# Patient Record
Sex: MALE | Race: WHITE | NOT HISPANIC OR LATINO | Employment: OTHER | URBAN - METROPOLITAN AREA
[De-identification: names, ages, dates, MRNs, and addresses within clinical notes are randomized per-mention and may not be internally consistent; named-entity substitution may affect disease eponyms.]

---

## 2017-06-11 ENCOUNTER — APPOINTMENT (OUTPATIENT)
Dept: RADIOLOGY | Facility: MEDICAL CENTER | Age: 67
DRG: 466 | End: 2017-06-11
Attending: ORTHOPAEDIC SURGERY
Payer: MEDICARE

## 2017-06-11 ENCOUNTER — APPOINTMENT (OUTPATIENT)
Dept: RADIOLOGY | Facility: MEDICAL CENTER | Age: 67
DRG: 466 | End: 2017-06-11
Attending: EMERGENCY MEDICINE
Payer: MEDICARE

## 2017-06-11 ENCOUNTER — HOSPITAL ENCOUNTER (OUTPATIENT)
Dept: RADIOLOGY | Facility: MEDICAL CENTER | Age: 67
End: 2017-06-11

## 2017-06-11 ENCOUNTER — RESOLUTE PROFESSIONAL BILLING HOSPITAL PROF FEE (OUTPATIENT)
Dept: HOSPITALIST | Facility: MEDICAL CENTER | Age: 67
End: 2017-06-11
Payer: MEDICARE

## 2017-06-11 ENCOUNTER — HOSPITAL ENCOUNTER (INPATIENT)
Facility: MEDICAL CENTER | Age: 67
LOS: 9 days | DRG: 466 | End: 2017-06-20
Attending: EMERGENCY MEDICINE | Admitting: INTERNAL MEDICINE
Payer: MEDICARE

## 2017-06-11 DIAGNOSIS — M00.262 STREPTOCOCCAL ARTHRITIS OF LEFT KNEE (HCC): ICD-10-CM

## 2017-06-11 LAB
ALBUMIN SERPL BCP-MCNC: 3.5 G/DL (ref 3.2–4.9)
ALBUMIN/GLOB SERPL: 1.3 G/DL
ALP SERPL-CCNC: 55 U/L (ref 30–99)
ALT SERPL-CCNC: 28 U/L (ref 2–50)
ANION GAP SERPL CALC-SCNC: 10 MMOL/L (ref 0–11.9)
APPEARANCE FLD: NORMAL
APPEARANCE UR: CLEAR
APTT PPP: 27.2 SEC (ref 24.7–36)
AST SERPL-CCNC: 14 U/L (ref 12–45)
BASOPHILS # BLD AUTO: 0 % (ref 0–1.8)
BASOPHILS # BLD: 0 K/UL (ref 0–0.12)
BILIRUB SERPL-MCNC: 0.5 MG/DL (ref 0.1–1.5)
BILIRUB UR QL STRIP.AUTO: NEGATIVE
BNP SERPL-MCNC: 67 PG/ML (ref 0–100)
BODY FLD TYPE: NORMAL
BUN SERPL-MCNC: 20 MG/DL (ref 8–22)
CALCIUM SERPL-MCNC: 8.5 MG/DL (ref 8.5–10.5)
CHLORIDE SERPL-SCNC: 105 MMOL/L (ref 96–112)
CO2 SERPL-SCNC: 18 MMOL/L (ref 20–33)
COLOR FLD: NORMAL
COLOR UR: YELLOW
CREAT SERPL-MCNC: 1.15 MG/DL (ref 0.5–1.4)
CRP SERPL HS-MCNC: 22.81 MG/DL (ref 0–0.75)
CSF COMMENTS 1658: NORMAL
EKG IMPRESSION: NORMAL
EOSINOPHIL # BLD AUTO: 0 K/UL (ref 0–0.51)
EOSINOPHIL NFR BLD: 0 % (ref 0–6.9)
EPI CELLS #/AREA URNS HPF: ABNORMAL /HPF
ERYTHROCYTE [DISTWIDTH] IN BLOOD BY AUTOMATED COUNT: 47 FL (ref 35.9–50)
FLUAV H1 2009 PAND RNA SPEC QL NAA+PROBE: NOT DETECTED
FLUAV RNA SPEC QL NAA+PROBE: NEGATIVE
FLUAV+FLUBV AG SPEC QL IA: NORMAL
FLUBV RNA SPEC QL NAA+PROBE: NEGATIVE
GFR SERPL CREATININE-BSD FRML MDRD: >60 ML/MIN/1.73 M 2
GLOBULIN SER CALC-MCNC: 2.6 G/DL (ref 1.9–3.5)
GLUCOSE SERPL-MCNC: 140 MG/DL (ref 65–99)
GLUCOSE UR STRIP.AUTO-MCNC: NEGATIVE MG/DL
GRAM STN SPEC: NORMAL
HCT VFR BLD AUTO: 42.1 % (ref 42–52)
HGB BLD-MCNC: 14.4 G/DL (ref 14–18)
INR PPP: 1.19 (ref 0.87–1.13)
KETONES UR STRIP.AUTO-MCNC: NEGATIVE MG/DL
LACTATE BLD-SCNC: 1.7 MMOL/L (ref 0.5–2)
LEUKOCYTE ESTERASE UR QL STRIP.AUTO: NEGATIVE
LYMPHOCYTES # BLD AUTO: 0.15 K/UL (ref 1–4.8)
LYMPHOCYTES NFR BLD: 0.9 % (ref 22–41)
MAGNESIUM SERPL-MCNC: 1.4 MG/DL (ref 1.5–2.5)
MANUAL DIFF BLD: ABNORMAL
MCH RBC QN AUTO: 31.4 PG (ref 27–33)
MCHC RBC AUTO-ENTMCNC: 34.2 G/DL (ref 33.7–35.3)
MCV RBC AUTO: 91.7 FL (ref 81.4–97.8)
MICRO URNS: ABNORMAL
MONOCYTES # BLD AUTO: 0.29 K/UL (ref 0–0.85)
MONOCYTES NFR BLD AUTO: 1.7 % (ref 0–13.4)
MORPHOLOGY BLD-IMP: NORMAL
MUCOUS THREADS #/AREA URNS HPF: ABNORMAL /HPF
NEUTROPHILS # BLD AUTO: 16.66 K/UL (ref 1.82–7.42)
NEUTROPHILS NFR BLD: 79.1 % (ref 44–72)
NEUTS BAND NFR BLD MANUAL: 18.3 % (ref 0–10)
NITRITE UR QL STRIP.AUTO: NEGATIVE
NRBC # BLD AUTO: 0 K/UL
NRBC BLD AUTO-RTO: 0 /100 WBC
PH UR STRIP.AUTO: 6 [PH]
PHOSPHATE SERPL-MCNC: 3.1 MG/DL (ref 2.5–4.5)
PLATELET # BLD AUTO: 119 K/UL (ref 164–446)
PLATELET BLD QL SMEAR: NORMAL
PMV BLD AUTO: 11.6 FL (ref 9–12.9)
POTASSIUM SERPL-SCNC: 3.6 MMOL/L (ref 3.6–5.5)
PROT SERPL-MCNC: 6.1 G/DL (ref 6–8.2)
PROT UR QL STRIP: 100 MG/DL
PROTHROMBIN TIME: 15.5 SEC (ref 12–14.6)
RBC # BLD AUTO: 4.59 M/UL (ref 4.7–6.1)
RBC # FLD: NORMAL CELLS/UL
RBC # URNS HPF: ABNORMAL /HPF
RBC BLD AUTO: NORMAL
RBC UR QL AUTO: ABNORMAL
SIGNIFICANT IND 70042: NORMAL
SIGNIFICANT IND 70042: NORMAL
SITE SITE: NORMAL
SITE SITE: NORMAL
SODIUM SERPL-SCNC: 133 MMOL/L (ref 135–145)
SOURCE SOURCE: NORMAL
SOURCE SOURCE: NORMAL
SP GR UR STRIP.AUTO: >1.035
TROPONIN I SERPL-MCNC: 0.04 NG/ML (ref 0–0.04)
WBC # BLD AUTO: 17.1 K/UL (ref 4.8–10.8)
WBC # FLD: NORMAL CELLS/UL
WBC #/AREA URNS HPF: ABNORMAL /HPF

## 2017-06-11 PROCEDURE — 503339 HCHG DRESSSING PICO: Performed by: ORTHOPAEDIC SURGERY

## 2017-06-11 PROCEDURE — 89051 BODY FLUID CELL COUNT: CPT

## 2017-06-11 PROCEDURE — 160036 HCHG PACU - EA ADDL 30 MINS PHASE I: Performed by: ORTHOPAEDIC SURGERY

## 2017-06-11 PROCEDURE — 160002 HCHG RECOVERY MINUTES (STAT): Performed by: ORTHOPAEDIC SURGERY

## 2017-06-11 PROCEDURE — 500891 HCHG PACK, ORTHO MAJOR: Performed by: ORTHOPAEDIC SURGERY

## 2017-06-11 PROCEDURE — 500054 HCHG BANDAGE, ELASTIC 6: Performed by: ORTHOPAEDIC SURGERY

## 2017-06-11 PROCEDURE — 501838 HCHG SUTURE GENERAL: Performed by: ORTHOPAEDIC SURGERY

## 2017-06-11 PROCEDURE — A6222 GAUZE <=16 IN NO W/SAL W/O B: HCPCS | Performed by: ORTHOPAEDIC SURGERY

## 2017-06-11 PROCEDURE — 501486 HCHG STRYKER IRRIG SET HC W/TUBING: Performed by: ORTHOPAEDIC SURGERY

## 2017-06-11 PROCEDURE — 73560 X-RAY EXAM OF KNEE 1 OR 2: CPT | Mod: LT

## 2017-06-11 PROCEDURE — 80053 COMPREHEN METABOLIC PANEL: CPT

## 2017-06-11 PROCEDURE — 500367 HCHG DRAIN KIT, HEMOVAC: Performed by: ORTHOPAEDIC SURGERY

## 2017-06-11 PROCEDURE — 86140 C-REACTIVE PROTEIN: CPT

## 2017-06-11 PROCEDURE — 700102 HCHG RX REV CODE 250 W/ 637 OVERRIDE(OP)

## 2017-06-11 PROCEDURE — 700111 HCHG RX REV CODE 636 W/ 250 OVERRIDE (IP)

## 2017-06-11 PROCEDURE — 160035 HCHG PACU - 1ST 60 MINS PHASE I: Performed by: ORTHOPAEDIC SURGERY

## 2017-06-11 PROCEDURE — 87400 INFLUENZA A/B EACH AG IA: CPT

## 2017-06-11 PROCEDURE — A9270 NON-COVERED ITEM OR SERVICE: HCPCS

## 2017-06-11 PROCEDURE — 700102 HCHG RX REV CODE 250 W/ 637 OVERRIDE(OP): Performed by: INTERNAL MEDICINE

## 2017-06-11 PROCEDURE — 700111 HCHG RX REV CODE 636 W/ 250 OVERRIDE (IP): Performed by: INTERNAL MEDICINE

## 2017-06-11 PROCEDURE — 87086 URINE CULTURE/COLONY COUNT: CPT

## 2017-06-11 PROCEDURE — 83605 ASSAY OF LACTIC ACID: CPT

## 2017-06-11 PROCEDURE — 99223 1ST HOSP IP/OBS HIGH 75: CPT | Mod: AI | Performed by: INTERNAL MEDICINE

## 2017-06-11 PROCEDURE — 700105 HCHG RX REV CODE 258

## 2017-06-11 PROCEDURE — 500122 HCHG BOVIE, BLADE: Performed by: ORTHOPAEDIC SURGERY

## 2017-06-11 PROCEDURE — 87205 SMEAR GRAM STAIN: CPT

## 2017-06-11 PROCEDURE — 502240 HCHG MISC OR SUPPLY RC 0272: Performed by: ORTHOPAEDIC SURGERY

## 2017-06-11 PROCEDURE — 84100 ASSAY OF PHOSPHORUS: CPT

## 2017-06-11 PROCEDURE — 99285 EMERGENCY DEPT VISIT HI MDM: CPT

## 2017-06-11 PROCEDURE — 85730 THROMBOPLASTIN TIME PARTIAL: CPT

## 2017-06-11 PROCEDURE — 87502 INFLUENZA DNA AMP PROBE: CPT

## 2017-06-11 PROCEDURE — 304561 HCHG STAT O2

## 2017-06-11 PROCEDURE — 83735 ASSAY OF MAGNESIUM: CPT

## 2017-06-11 PROCEDURE — 502000 HCHG MISC OR IMPLANTS RC 0278: Performed by: ORTHOPAEDIC SURGERY

## 2017-06-11 PROCEDURE — 770006 HCHG ROOM/CARE - MED/SURG/GYN SEMI*

## 2017-06-11 PROCEDURE — 700101 HCHG RX REV CODE 250

## 2017-06-11 PROCEDURE — 160041 HCHG SURGERY MINUTES - EA ADDL 1 MIN LEVEL 4: Performed by: ORTHOPAEDIC SURGERY

## 2017-06-11 PROCEDURE — 160009 HCHG ANES TIME/MIN: Performed by: ORTHOPAEDIC SURGERY

## 2017-06-11 PROCEDURE — 85007 BL SMEAR W/DIFF WBC COUNT: CPT

## 2017-06-11 PROCEDURE — 87503 INFLUENZA DNA AMP PROB ADDL: CPT

## 2017-06-11 PROCEDURE — 700105 HCHG RX REV CODE 258: Performed by: EMERGENCY MEDICINE

## 2017-06-11 PROCEDURE — 87040 BLOOD CULTURE FOR BACTERIA: CPT

## 2017-06-11 PROCEDURE — 87075 CULTR BACTERIA EXCEPT BLOOD: CPT

## 2017-06-11 PROCEDURE — 85610 PROTHROMBIN TIME: CPT

## 2017-06-11 PROCEDURE — 71010 DX-CHEST-PORTABLE (1 VIEW): CPT

## 2017-06-11 PROCEDURE — 0S9D3ZX DRAINAGE OF LEFT KNEE JOINT, PERCUTANEOUS APPROACH, DIAGNOSTIC: ICD-10-PCS | Performed by: ORTHOPAEDIC SURGERY

## 2017-06-11 PROCEDURE — 93005 ELECTROCARDIOGRAM TRACING: CPT | Performed by: EMERGENCY MEDICINE

## 2017-06-11 PROCEDURE — 87070 CULTURE OTHR SPECIMN AEROBIC: CPT

## 2017-06-11 PROCEDURE — 160048 HCHG OR STATISTICAL LEVEL 1-5: Performed by: ORTHOPAEDIC SURGERY

## 2017-06-11 PROCEDURE — 96360 HYDRATION IV INFUSION INIT: CPT

## 2017-06-11 PROCEDURE — 160029 HCHG SURGERY MINUTES - 1ST 30 MINS LEVEL 4: Performed by: ORTHOPAEDIC SURGERY

## 2017-06-11 PROCEDURE — 81001 URINALYSIS AUTO W/SCOPE: CPT

## 2017-06-11 PROCEDURE — 87077 CULTURE AEROBIC IDENTIFY: CPT

## 2017-06-11 PROCEDURE — 83880 ASSAY OF NATRIURETIC PEPTIDE: CPT

## 2017-06-11 PROCEDURE — 84484 ASSAY OF TROPONIN QUANT: CPT

## 2017-06-11 PROCEDURE — A9270 NON-COVERED ITEM OR SERVICE: HCPCS | Performed by: INTERNAL MEDICINE

## 2017-06-11 PROCEDURE — 85027 COMPLETE CBC AUTOMATED: CPT

## 2017-06-11 PROCEDURE — 700105 HCHG RX REV CODE 258: Performed by: INTERNAL MEDICINE

## 2017-06-11 DEVICE — IMPLANTABLE DEVICE: Type: IMPLANTABLE DEVICE | Status: FUNCTIONAL

## 2017-06-11 RX ORDER — AMOXICILLIN 250 MG
2 CAPSULE ORAL 2 TIMES DAILY
Status: DISCONTINUED | OUTPATIENT
Start: 2017-06-11 | End: 2017-06-18

## 2017-06-11 RX ORDER — SODIUM CHLORIDE 9 MG/ML
500 INJECTION, SOLUTION INTRAVENOUS
Status: COMPLETED | OUTPATIENT
Start: 2017-06-11 | End: 2017-06-11

## 2017-06-11 RX ORDER — LATANOPROST 50 UG/ML
1 SOLUTION/ DROPS OPHTHALMIC EVERY EVENING
Status: DISCONTINUED | OUTPATIENT
Start: 2017-06-11 | End: 2017-06-20 | Stop reason: HOSPADM

## 2017-06-11 RX ORDER — ONDANSETRON 2 MG/ML
4 INJECTION INTRAMUSCULAR; INTRAVENOUS EVERY 4 HOURS PRN
Status: DISCONTINUED | OUTPATIENT
Start: 2017-06-11 | End: 2017-06-20 | Stop reason: HOSPADM

## 2017-06-11 RX ORDER — SODIUM CHLORIDE 9 MG/ML
1000 INJECTION, SOLUTION INTRAVENOUS ONCE
Status: COMPLETED | OUTPATIENT
Start: 2017-06-11 | End: 2017-06-11

## 2017-06-11 RX ORDER — MAGNESIUM HYDROXIDE 1200 MG/15ML
LIQUID ORAL
Status: DISCONTINUED | OUTPATIENT
Start: 2017-06-11 | End: 2017-06-11 | Stop reason: HOSPADM

## 2017-06-11 RX ORDER — POLYETHYLENE GLYCOL 3350 17 G/17G
1 POWDER, FOR SOLUTION ORAL
Status: DISCONTINUED | OUTPATIENT
Start: 2017-06-11 | End: 2017-06-20 | Stop reason: HOSPADM

## 2017-06-11 RX ORDER — ENALAPRILAT 1.25 MG/ML
1.25 INJECTION INTRAVENOUS EVERY 6 HOURS PRN
Status: DISCONTINUED | OUTPATIENT
Start: 2017-06-11 | End: 2017-06-20 | Stop reason: HOSPADM

## 2017-06-11 RX ORDER — GLIMEPIRIDE 2 MG/1
1 TABLET ORAL 2 TIMES DAILY
Status: DISCONTINUED | OUTPATIENT
Start: 2017-06-11 | End: 2017-06-20 | Stop reason: HOSPADM

## 2017-06-11 RX ORDER — MAGNESIUM SULFATE HEPTAHYDRATE 40 MG/ML
4 INJECTION, SOLUTION INTRAVENOUS ONCE
Status: COMPLETED | OUTPATIENT
Start: 2017-06-11 | End: 2017-06-11

## 2017-06-11 RX ORDER — ACETAMINOPHEN 325 MG/1
650 TABLET ORAL EVERY 6 HOURS PRN
Status: DISCONTINUED | OUTPATIENT
Start: 2017-06-11 | End: 2017-06-20 | Stop reason: HOSPADM

## 2017-06-11 RX ORDER — BISACODYL 10 MG
10 SUPPOSITORY, RECTAL RECTAL
Status: DISCONTINUED | OUTPATIENT
Start: 2017-06-11 | End: 2017-06-20 | Stop reason: HOSPADM

## 2017-06-11 RX ORDER — POTASSIUM CHLORIDE 20 MEQ/1
40 TABLET, EXTENDED RELEASE ORAL ONCE
Status: COMPLETED | OUTPATIENT
Start: 2017-06-11 | End: 2017-06-11

## 2017-06-11 RX ORDER — SODIUM CHLORIDE 9 MG/ML
30 INJECTION, SOLUTION INTRAVENOUS
Status: COMPLETED | OUTPATIENT
Start: 2017-06-11 | End: 2017-06-13

## 2017-06-11 RX ORDER — LIDOCAINE HYDROCHLORIDE AND EPINEPHRINE BITARTRATE 20; .01 MG/ML; MG/ML
20 INJECTION, SOLUTION SUBCUTANEOUS ONCE
Status: DISPENSED | OUTPATIENT
Start: 2017-06-11 | End: 2017-06-12

## 2017-06-11 RX ORDER — ONDANSETRON 4 MG/1
4 TABLET, ORALLY DISINTEGRATING ORAL EVERY 4 HOURS PRN
Status: DISCONTINUED | OUTPATIENT
Start: 2017-06-11 | End: 2017-06-20 | Stop reason: HOSPADM

## 2017-06-11 RX ORDER — OXYCODONE HCL 5 MG/5 ML
SOLUTION, ORAL ORAL
Status: COMPLETED
Start: 2017-06-11 | End: 2017-06-11

## 2017-06-11 RX ADMIN — SODIUM CHLORIDE 500 ML: 9 INJECTION, SOLUTION INTRAVENOUS at 16:18

## 2017-06-11 RX ADMIN — MAGNESIUM SULFATE IN WATER 4 G: 40 INJECTION, SOLUTION INTRAVENOUS at 16:29

## 2017-06-11 RX ADMIN — SODIUM CHLORIDE 1000 ML: 9 INJECTION, SOLUTION INTRAVENOUS at 12:18

## 2017-06-11 RX ADMIN — VANCOMYCIN HYDROCHLORIDE 1500 MG: 100 INJECTION, POWDER, LYOPHILIZED, FOR SOLUTION INTRAVENOUS at 21:01

## 2017-06-11 RX ADMIN — OXYCODONE HYDROCHLORIDE 10 MG: 5 SOLUTION ORAL at 21:50

## 2017-06-11 RX ADMIN — AMPICILLIN SODIUM AND SULBACTAM SODIUM 3 G: 2; 1 INJECTION, POWDER, FOR SOLUTION INTRAMUSCULAR; INTRAVENOUS at 20:54

## 2017-06-11 RX ADMIN — POTASSIUM CHLORIDE 40 MEQ: 1500 TABLET, EXTENDED RELEASE ORAL at 16:28

## 2017-06-11 ASSESSMENT — LIFESTYLE VARIABLES
AVERAGE NUMBER OF DAYS PER WEEK YOU HAVE A DRINK CONTAINING ALCOHOL: 4
EVER FELT BAD OR GUILTY ABOUT YOUR DRINKING: NO
HAVE PEOPLE ANNOYED YOU BY CRITICIZING YOUR DRINKING: NO
ON A TYPICAL DAY WHEN YOU DRINK ALCOHOL HOW MANY DRINKS DO YOU HAVE: 1
HAVE YOU EVER FELT YOU SHOULD CUT DOWN ON YOUR DRINKING: NO
TOTAL SCORE: 0
TOTAL SCORE: 0
CONSUMPTION TOTAL: NEGATIVE
TOTAL SCORE: 0
EVER HAD A DRINK FIRST THING IN THE MORNING TO STEADY YOUR NERVES TO GET RID OF A HANGOVER: NO
ALCOHOL_USE: YES
HOW MANY TIMES IN THE PAST YEAR HAVE YOU HAD 5 OR MORE DRINKS IN A DAY: 0
EVER_SMOKED: NEVER

## 2017-06-11 ASSESSMENT — PAIN SCALES - GENERAL
PAINLEVEL_OUTOF10: 3
PAINLEVEL_OUTOF10: 1
PAINLEVEL_OUTOF10: 6
PAINLEVEL_OUTOF10: 1

## 2017-06-11 NOTE — IP AVS SNAPSHOT
6/20/2017    Tony Stokes  [address]  [facility] [address]    Dear Tony:    [facility] wants to ensure your discharge home is safe and you or your loved ones have had all of your questions answered regarding your care after you leave the hospital.    Below is a list of resources and contact information should you have any questions regarding your hospital stay, follow-up instructions, or active medical symptoms.    Questions or Concerns Regarding… Contact   Medical Questions Related to Your Discharge  (7 days a week, 8am-5pm) Contact a Nurse Care Coordinator   [phone]   Medical Questions Not Related to Your Discharge  (24 hours a day / 7 days a week)  Contact the Nurse Health Line   [phone]    Medications or Discharge Instructions Refer to your discharge packet   or contact your [facility] Primary Care Provider   [phone]   Follow-up Appointment(s) Schedule your appointment via [employer]   or contact Scheduling [phone]   Billing Review your statement via [employer]  or contact Billing [phone]   Medical Records Review your records via [employer]   or contact Medical Records [phone]     You may receive a telephone call within two days of discharge. This call is to make certain you understand your discharge instructions and have the opportunity to have any questions answered. You can also easily access your medical information, test results and upcoming appointments via the [employer] free online health management tool. You can learn more and sign up at [employer]. For assistance setting up your [employer] account, please call [phone].    Once again, we want to ensure your discharge home is safe and that you have a clear understanding of any next steps in your care. If you have any questions or concerns, please do not hesitate to contact us, we are here for you. Thank you for choosing [facility] for your healthcare needs.    Sincerely,    Your [facility] Healthcare Team

## 2017-06-11 NOTE — ED NOTES
68 y/o male bib [facility] from [facility] for evaluation of possible infection in his left knee where he has had knee replacement surgery. Pt was given Rocephin 2 grams and Vancomycin prior to arrival to ED.

## 2017-06-11 NOTE — IP AVS SNAPSHOT
Coordi-Careâ€™s Access Code: [email or web address]  Expires: 7/20/2017  3:50 PM    Coordi-Careâ€™s  A secure, online tool to manage your health information     [employer] Coordi-Careâ€™s® is a secure, online tool that connects you to your personalized health information from the privacy of your home -- day or night - making it very easy for you to manage your healthcare. Once the activation process is completed, you can even access your medical information using the Coordi-Careâ€™s serena, which is available for free in the Apple Serena store or Google Play store.     Coordi-Careâ€™s provides the following levels of access (as shown below):   My Chart Features   [facility] Primary Care Doctor [facility]  Specialists [facility]  Urgent  Care Non-[facility]  Primary Care  Doctor   Email your healthcare team securely and privately 24/7 X X X X   Manage appointments: schedule your next appointment; view details of past/upcoming appointments X      Request prescription refills. X      View recent personal medical records, including lab and immunizations X X X X   View health record, including health history, allergies, medications X X X X   Read reports about your outpatient visits, procedures, consult and ER notes X X X X   See your discharge summary, which is a recap of your hospital and/or ER visit that includes your diagnosis, lab results, and care plan. X X       How to register for Coordi-Careâ€™s:  1. Go to  [email or web address].  2. Click on the Sign Up Now box, which takes you to the New Member Sign Up page. You will need to provide the following information:  a. Enter your Coordi-Careâ€™s Access Code exactly as it appears at the top of this page. (You will not need to use this code after you’ve completed the sign-up process. If you do not sign up before the expiration date, you must request a new code.)   b. Enter your date of birth.   c. Enter your home email address.   d. Click Submit, and follow the next screen’s instructions.  3. Create a Coordi-Careâ€™s ID. This will be your [employer]  login ID and cannot be changed, so think of one that is secure and easy to remember.  4. Create a [employer] password. You can change your password at any time.  5. Enter your Password Reset Question and Answer. This can be used at a later time if you forget your password.   6. Enter your e-mail address. This allows you to receive e-mail notifications when new information is available in [employer].  7. Click Sign Up. You can now view your health information.    For assistance activating your [employer] account, call [phone]

## 2017-06-11 NOTE — PROGRESS NOTES
Patient arrived to unit by yi, no report received from ED, attempted to contact and was told pt was on his way to room and she was just covering for transferring nurse.  Two RN skin check completed with Fatou RN, pt skin is intact with no s/s of open wounds or breakdown.  Oxygen by NC at 4L, IV s/l, weber POA; per pt placed in [facility] prior to transfer, room orientation completed, fall precautions in place, current POC/orders discussed with pt and spouse at bedside.

## 2017-06-11 NOTE — IP AVS SNAPSHOT
" <p align=\"LEFT\"><IMG SRC=\"//[email or web address]$/[email or web address]\" alt=\"Image\" WIDTH=\"50%\" HEIGHT=\"200\" BORDER=\"\"></p>                   Name:Tony Stokes  Medical Record Number:[id number]  CSN: [id number]    YOB: 1950   Age: 67 y.o.  Sex: male  HT:  WT: 117.935 kg (260 lb)          Admit Date: 6/11/2017     Discharge Date:   Today's Date: 6/20/2017  Attending Doctor:  Dakota Guzman M.D.                  Allergies:  Review of patient's allergies indicates no known allergies.          Follow-up Information     1. Follow up with Huang Cat M.D.. Schedule an appointment as soon as possible for a visit in 2 weeks.    Specialty:  Orthopaedics    Contact information    [address] [place] [address]  [phone]           Medication List      Take these Medications        Instructions    acetaminophen 325 MG Tabs   Commonly known as:  TYLENOL    Take 2 Tabs by mouth every 6 hours as needed (Mild Pain; (Pain scale 1-3); Temp greater than 100.5 F).   Dose:  650 mg       heparin 5000 UNIT/ML Soln    Inject 1 mL as instructed every 12 hours.   Dose:  5000 Units       latanoprost 0.005 % Soln   Commonly known as:  XALATAN    Place 1 Drop in both eyes every evening.   Dose:  1 Drop       NS SOLN 100 mL with cefTRIAXone 2 GM SOLR 2 g    2 g by Intravenous route every 24 hours for 27 days.   Dose:  2 g       ondansetron 4 MG Tbdp   Commonly known as:  ZOFRAN ODT    Take 1 Tab by mouth every four hours as needed for Nausea/Vomiting (give PO if IV route is unavailable. May give per feeding tube.).   Dose:  4 mg       polyethylene glycol/lytes Pack   Commonly known as:  MIRALAX    Take 1 Packet by mouth every day.   Dose:  17 g       timolol 0.25 % Soln   What changed:  when to take this   Commonly known as:  TIMOPTIC    Place 1 Drop in both eyes every day.   Dose:  1 Drop         "

## 2017-06-11 NOTE — PROGRESS NOTES
Pharmacy Kinetics 67 y.o. male on vancomycin day # 1 2017    Currently on Vancomycin new start (received 1 g @ 1100 )    Indication for Treatment: septic L knee     Pertinent history per medical record: Admitted on 2017 for L leg swelling and pain. Patient has a history of bilateral TKA, and has experienced similar episodes in the past with cultures demonstrating Group G Streptococcus. Patient received vancomycin 1000 mg IV PTA.     Other antibiotics: Unasyn 3 g IV q6h    Allergies: Review of patient's allergies indicates no known allergies.     List concerns for renal function: age, BMI ~36    Pertinent cultures to date:    PBC x 2: in process   fluid culture: in process   influenza PCR & rapid: negative    Recent Labs      17   1220   WBC  17.1*   NEUTSPOLYS  79.10*   BANDSSTABS  18.30*     Recent Labs      17   1220   BUN  20   CREATININE  1.15   ALBUMIN  3.5     Blood pressure 102/74, pulse 96, temperature 36.7 °C (98 °F), resp. rate 18, weight 117.935 kg (260 lb), SpO2 94 %. Temp (24hrs), Av.7 °C (98 °F), Min:36.7 °C (98 °F), Max:36.7 °C (98 °F)      A/P   1. Vancomycin dose change: initiate 1500 mg IV q12h  2. Next vancomycin level: prior to 4th total dose (not yet ordered)  3. Goal trough: 12-16 mcg/mL  4. Comments: new start vancomycin for possible infected prosthetic joint. Fluid and blood cultures in process.  Patient has tolerated this dose in the past, was discharged to [facility] in  on q12h dosing. Trough levels at that time were not quite therapeutic. Will initiate this dosing for now and check a level when at steady state. Renal function close to baseline. Will continue to follow.    Lorelei Hilton, PHARMD

## 2017-06-11 NOTE — ED PROVIDER NOTES
ED Provider Note    Scribed for Raciel Colvin D.O. by Janelle Moody. 6/11/2017  11:49 AM    Primary care provider: Jakob Magana M.D.  Means of arrival: Care flight  History obtained from: Patient   History limited by: None    CHIEF COMPLAINT  Chief Complaint   Patient presents with   • Knee Pain   • Blood Infection       HPI  Tony Stokes is a 67 y.o. male who presents to the Emergency Department by care flight as a transfer from [facility] for left leg swelling and right leg pain, onset last night. He began feeling severe left knee pain last night and noticed new swelling to his left leg. The patient states it is painful for him to ambulate. He began having a fever yesterday afternoon. The patient has had a runny nose and cough with sputum production for two days. He denies dysuria or abdominal pain associated. He has history of double knee replacement preformed by Dr. Lewis at [facility]. The patient has history of blood clot in left leg but is not taking blood thinners.     Patient received 2g of Rocephin and 1g of Vancomycin while in transit and has a weber catheter in place.    Impression from outside hospital:  Exam is limited due to suboptimal contrast enhancement of the pulmonary arteries. There is no obvious central pulmonary embolism. There are mild atelectatic changes involving the right lower lobe with elevation of right hemidiaphragm. This is unchanged in appearance when compared to 1/4/15.      REVIEW OF SYSTEMS  Pertinent positives include left leg swelling, left knee pain. Pertinent negatives include no abdominal pain or dysuria.  All other systems reviewed and negative.  C.    PAST MEDICAL HISTORY  Past Medical History   Diagnosis Date   • Arthritis      knees   • Glaucoma      mavis eyes   • MRSA (methicillin resistant staph aureus) culture positive      per nasal swab   • Snoring    • Unspecified hemorrhagic conditions      Coumadin   • Personal history of  venous thrombosis and embolism 3/2014     leg and  bilateral PE       SURGICAL HISTORY  Past Surgical History   Procedure Laterality Date   • Irrigation & debridement ortho  3/23/2014     Performed by Blas Lewis M.D. at [facility]   • Other neurological surg  2009     laminectomy   • Irrigation & debridement ortho  7/23/2014     Performed by Blas Lewis M.D. at SUR[facility]   • Knee revision total  2004     Right   • Knee revision total  2010     Left   • Femur orif  1983     Right   • Other orthopedic surgery  3/24/2014     sremoval of prosthesis   • Knee revision total  9/11/2014     Performed by Blas Lewis M.D. at SUR[facility]ERY [facility]        SOCIAL HISTORY  Social History   Substance Use Topics   • Smoking status: Never Smoker    • Smokeless tobacco: Never Used   • Alcohol Use: Yes      Comment: 1-2 per week      History   Drug Use No       FAMILY HISTORY  No family history noted    CURRENT MEDICATIONS  No current facility-administered medications on file prior to encounter.     Current Outpatient Prescriptions on File Prior to Encounter   Medication Sig Dispense Refill   • timolol (TIMOPTIC) 0.25 % SOLN Place 1 Drop in both eyes every day. (Patient taking differently: Place 1 Drop in both eyes 2 times a day.) 1 Bottle 3   • latanoprost (XALATAN) 0.005 % SOLN Place 1 Drop in both eyes every evening. 1 Bottle          ALLERGIES  No Known Allergies    PHYSICAL EXAM  VITAL SIGNS: /74 mmHg  Pulse 96  Temp(Src) 36.7 °C (98 °F)  Resp 18  Wt 117.935 kg (260 lb)  SpO2 94%    Nursing notes and vitals reviewed.  Constitutional: Well developed, Well nourished, slight distress, Non-toxic appearance.   Eyes: PERRLA, EOMI, Conjunctiva normal, No discharge.   Cardiovascular: Normal heart rate, Normal rhythm, No murmurs, No rubs, No gallops.   Thorax & Lungs: No respiratory distress, No rales, No rhonchi, No wheezing, No chest tenderness.   Abdomen: Bowel sounds normal,  Soft, No tenderness, No guarding, No rebound, No masses, No pulsatile masses.   Skin: Warm, Dry, No erythema, No rash. Diaphoretic. Erythema that is circumferential  to left lower extremity from knee down to ankle.   Musculoskeletal: Intact distal pulses, No edema, No cyanosis, No clubbing.  No tenderness to palpation or major deformities noted, no CVA tenderness, no midline back tenderness. Exquisite tenderness to left knee joint and exquisite difficulty with flexion of left knee secondary to pain. Slightly edematous left ex. Pulses are brisk.   Neurologic: Alert & oriented x 3, Normal motor function, Normal sensory function, No focal deficits noted.  Psychiatric: Affect normal for clinical presentation.    DIAGNOSTIC STUDIES/PROCEDURES    LABS  Results for orders placed or performed during the hospital encounter of 06/11/17   LACTIC ACID   Result Value Ref Range    Lactic Acid 1.7 0.5 - 2.0 mmol/L   CBC WITH DIFFERENTIAL   Result Value Ref Range    WBC 17.1 (H) 4.8 - 10.8 K/uL    RBC 4.59 (L) 4.70 - 6.10 M/uL    Hemoglobin 14.4 14.0 - 18.0 g/dL    Hematocrit 42.1 42.0 - 52.0 %    MCV 91.7 81.4 - 97.8 fL    MCH 31.4 27.0 - 33.0 pg    MCHC 34.2 33.7 - 35.3 g/dL    RDW 47.0 35.9 - 50.0 fL    Platelet Count 119 (L) 164 - 446 K/uL    MPV 11.6 9.0 - 12.9 fL    Nucleated RBC 0.00 /100 WBC    NRBC (Absolute) 0.00 K/uL    Neutrophils-Polys 79.10 (H) 44.00 - 72.00 %    Lymphocytes 0.90 (L) 22.00 - 41.00 %    Monocytes 1.70 0.00 - 13.40 %    Eosinophils 0.00 0.00 - 6.90 %    Basophils 0.00 0.00 - 1.80 %    Neutrophils (Absolute) 16.66 (H) 1.82 - 7.42 K/uL    Lymphs (Absolute) 0.15 (L) 1.00 - 4.80 K/uL    Monos (Absolute) 0.29 0.00 - 0.85 K/uL    Eos (Absolute) 0.00 0.00 - 0.51 K/uL    Baso (Absolute) 0.00 0.00 - 0.12 K/uL   COMP METABOLIC PANEL   Result Value Ref Range    Sodium 133 (L) 135 - 145 mmol/L    Potassium 3.6 3.6 - 5.5 mmol/L    Chloride 105 96 - 112 mmol/L    Co2 18 (L) 20 - 33 mmol/L    Anion Gap 10.0 0.0 - 11.9     Glucose 140 (H) 65 - 99 mg/dL    Bun 20 8 - 22 mg/dL    Creatinine 1.15 0.50 - 1.40 mg/dL    Calcium 8.5 8.5 - 10.5 mg/dL    AST(SGOT) 14 12 - 45 U/L    ALT(SGPT) 28 2 - 50 U/L    Alkaline Phosphatase 55 30 - 99 U/L    Total Bilirubin 0.5 0.1 - 1.5 mg/dL    Albumin 3.5 3.2 - 4.9 g/dL    Total Protein 6.1 6.0 - 8.2 g/dL    Globulin 2.6 1.9 - 3.5 g/dL    A-G Ratio 1.3 g/dL   URINALYSIS   Result Value Ref Range    Micro Urine Req Microscopic     Color Yellow     Character Clear     Specific Gravity >1.035 (A) <1.035    Ph 6.0 5.0-8.0    Glucose Negative Negative mg/dL    Ketones Negative Negative mg/dL    Protein 100 (A) Negative mg/dL    Bilirubin Negative Negative    Nitrite Negative Negative    Leukocyte Esterase Negative Negative    Occult Blood Moderate (A) Negative   MAGNESIUM   Result Value Ref Range    Magnesium 1.4 (L) 1.5 - 2.5 mg/dL   PHOSPHORUS   Result Value Ref Range    Phosphorus 3.1 2.5 - 4.5 mg/dL   Influenza Rapid   Result Value Ref Range    Significant Indicator NEG     Source RESP     Site Nasopharyngeal     Rapid Influenza A-B       Negative for Influenza A and Influenza B antigens.  Infection due to influenza A or B cannot be ruled out  since the antigen present in the specimen may be below the  detection limit of the test. Culture confirmation of  negative samples is recommended.     TROPONIN   Result Value Ref Range    Troponin I 0.04 0.00 - 0.04 ng/mL   BNP   Result Value Ref Range    B Natriuretic Peptide 67 0 - 100 pg/mL   PROTHROMBIN TIME   Result Value Ref Range    PT 15.5 (H) 12.0 - 14.6 sec    INR 1.19 (H) 0.87 - 1.13   APTT   Result Value Ref Range    APTT 27.2 24.7 - 36.0 sec   INFLUENZA BY PCR, A/B/H1N1   Result Value Ref Range    Influenza virus A RNA Negative Negative    Influenza virus B RNA Negative Negative    Influenza A 2009, H1N1, PCR Not Detected Negative   URINE MICROSCOPIC (W/UA)   Result Value Ref Range    WBC 2-5 (A) /hpf    RBC 10-20 (A) /hpf    Epithelial Cells Few  /hpf    Mucous Threads Few /hpf   DIFFERENTIAL MANUAL   Result Value Ref Range    Bands-Stabs 18.30 (H) 0.00 - 10.00 %    Manual Diff Status PERFORMED    PERIPHERAL SMEAR REVIEW   Result Value Ref Range    Peripheral Smear Review see below    PLATELET ESTIMATE   Result Value Ref Range    Plt Estimation Decreased    MORPHOLOGY   Result Value Ref Range    RBC Morphology Normal    ESTIMATED GFR   Result Value Ref Range    GFR If African American >60 >60 mL/min/1.73 m 2    GFR If Non African American >60 >60 mL/min/1.73 m 2   EKG (ER)   Result Value Ref Range    Report       [facility] Emergency Dept.    Test Date:  2017  Pt Name:    MIRANDA GARCIA                 Department: ER  MRN:        [id number]                      Room:        12  Gender:     M                            Technician: [id number]  :        1950                   Requested By:GINA BUENO  Order #:    [id number]                    Reading MD: GINA BUENO DO    Measurements  Intervals                                Axis  Rate:       92                           P:          51  DC:         148                          QRS:        -18  QRSD:       120                          T:          45  QT:         380  QTc:        471    Interpretive Statements  SINUS RHYTHM  NONSPECIFIC INTRAVENTRICULAR CONDUCTION DELAY  Compared to ECG 2014 11:53:29  Sinus tachycardia no longer present    Electronically Signed On 2017 14:05:37 PDT by GINA BUENO DO         All labs reviewed by me.       RADIOLOGY  OUTSIDE IMAGES-CT CHEST   Preliminary Result      DX-CHEST-PORTABLE (1 VIEW)    (Results Pending)   DX-KNEE 2- LEFT    (Results Pending)     The radiologist's interpretation of all radiological studies have been reviewed by me.    COURSE & MEDICAL DECISION MAKING  Pertinent Labs & Imaging studies reviewed. (See chart for details)    EKG reviewed for comparison which was negative. CBC and chemistry also  reviewed for comparison. Old notes reviewed for comparison.    11:49 AM - Patient seen and examined at bedside. Patient will be treated with IV fluids for sepsis, lidocaine-epinephrine 2% injection. Ordered DX-chest, Lactic acid, influenza by PCR, Phosphorus, influenza rapid, troponin, BNP, CBC with differential, CMP, Urinalysis, urine culture, blood culture, magnesium, prothrombin time, APTT, EKG to evaluate his symptoms.    12:24 PM Paged Dr. Cat (Orthopedics).       12:38 PM - I discussed the patient's case and the above findings with Dr. Cat (Orthopedics) who agrees to see the patient.   This is a charming 67 y.o. male that presents with sepsis and cellulitis the left lower extremity. The workup of the outlying facility included a CTA pulmonary and U Matthew was negative for large pulmonary embolism or infiltrate. Here the patient's lactic acid is 1.7, does have a leukocytosis, he has a normal blood pressure and does not require vasopressors and is hemodynamically stable. Dr. Nicholas with orthopedics of doing arthrocentesis and the patient has received antibiotics in the form of Rocephin and vancomycin prior to arrival. The patient has been admitted to Dr. Nicholas for further evaluation, management and Dr. Nicholas with orthopedics will be addressing his cellulitis and possible toxic knee joint.    FINAL IMPRESSION  Cellulitis  Septic joint  Sepsis     Janelle LANE (Alvin), am scribing for, and in the presence of, Raciel Colvin D.O    Electronically signed by: Janelle Moody (Alvin), 6/11/2017    aRciel LANE D.O. personally performed the services described in this documentation, as scribed by Janelle Moody in my presence, and it is both accurate and complete.    The note accurately reflects work and decisions made by me.  Raciel Colvin  6/11/2017  2:05 PM

## 2017-06-11 NOTE — CONSULTS
DATE OF SERVICE:  06/11/2017    HISTORY OF PRESENT ILLNESS:  This is a very pleasant 67-year-old man who was   flown here from [facility] for left leg pain and swelling, which happened   since last night.  He did have severe left knee pain last night and   significant swelling.  It has been painful for him to walk.  He has also had a   runny nose and cough with sputum production for the last 2 days.  He was   given Rocephin and vancomycin and a Boyce was placed at an outside hospital.    By report, the patient had a revision of total knee replacement for infection   in 2014 by Dr. Blas Lewis.    PHYSICAL EXAMINATION  EXTREMITIES:  He has bilateral knee replacements.  The right knee appears to   be completely benign with a well-healed incision and no neurologic deficits or   erythema.  The left knee; however, does appear erythematous around the   incision and he has erythema distally all the way to the top of the ankle.  He   is blanching.  He does have some redness focally around the distal aspect of   his prior total knee replacement incision.  Range of motion about the knee is   painful.    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    ASSESSMENT AND PLAN:  I would like to get x-rays for this patient to take a   look at the knee.  I will also send the images to Dr. Lewis.  I will defer at   this time to aspirating the knee until I hear back from him regarding a plan.    I would like the patient to be kept n.p.o. just in case he needs an operation   today versus tomorrow.  I will provide further recommendations per Dr. Lewis.    Unfortunately, antibiotics have been administered.  Obviously, I prefer that   they have not been, but it sounds like the patient did meet septic criteria.    Obviously, if the patient is unstable certainly please administer   antibiotics, but I would prefer that they be held at this time.  I will follow   for further recommendations.        ____________________________________     MD SILVANO Lawrence    DD:  06/11/2017 12:28:38  DT:  06/11/2017 16:12:07    D#:  [id number]  Job#:  [id number]

## 2017-06-11 NOTE — IP AVS SNAPSHOT
Home Care Instructions                                                                                                                  Name:Tony Stokes  Medical Record Number:[id number]  CSN: [id number]    YOB: 1950   Age: 67 y.o.  Sex: male  HT:  WT: 117.935 kg (260 lb)          Admit Date: 6/11/2017     Discharge Date:   Today's Date: 6/20/2017  Attending Doctor:  Dakota Guzman M.D.                  Allergies:  Review of patient's allergies indicates no known allergies.            Discharge Instructions       Discharge Instructions    Discharged to [facility] by car with escort. Discharged via wheelchair, hospital escort: Yes.  Special equipment needed: Not Applicable    Be sure to schedule a follow-up appointment with your primary care doctor or any specialists as instructed.     Discharge Plan:   Diet Plan: Discussed  Activity Level: Discussed  Confirmed Follow up Appointment: Patient to Call and Schedule Appointment  Confirmed Symptoms Management: Discussed  Medication Reconciliation Updated: Yes  Pneumococcal Vaccine Given - only chart on this line when given: Given (See MAR)  Influenza Vaccine Indication: Patient Refuses    I understand that a diet low in cholesterol, fat, and sodium is recommended for good health. Unless I have been given specific instructions below for another diet, I accept this instruction as my diet prescription.   Other diet: Regular    Special Instructions: Discharge instructions for the Orthopedic Patient    Follow up with Primary Care Physician within 2 weeks of discharge to home, regarding:  Review of medications and diagnostic testing.  Surveillance for medical complications.  Workup and treatment of osteoporosis, if appropriate.     -Is this a Joint Replacement patient? Yes Total Joint Knee Replacement Discharge Instructions    Pain  - The goal is to slowly wean off the prescription pain medicine.  - Ice can be used for pain control.  20 minutes at a  time is recommended, and never directly against your skin or incision.  - Most patients are off the pain pills by 3 weeks; others may require a low level of pain medications for many months.  If your pain continues to be severe, follow up with your physician.  Infection    Knee joint infections; occur in fewer than 2% of patients. The most common causes of infection following total knee replacement surgery are from bacteria that enter the bloodstream during dental procedures, urinary tract infections, or skin infections. These bacteria can lodge around your knee replacement and cause an infection.  - Keep the incision as clean and dry as possible.  - Always wash your hands before touching your incision.  - Skin infections tend to develop around 7-10 days after surgery; most can be treated with oral antibiotics.  - Dental Care should be delayed for 3 months after surgery, your surgeon recommends taking a dose of antibiotics 1 hour prior to any dental procedure. After 2 years, most surgeons recommend antibiotics only before an extensive procedure.  Ask your surgeon what he recommends.  - Signs and symptoms of infection can include:  low grade fever, redness, pain, swelling and drainage from your incision.  Notify your surgeon immediately if you develop any of these symptoms.  Other instructions  - Bowel habits - constipation is extremely common and is caused by a combination of anesthesia, lack of mobility and pain medicine.  Use stool softeners or laxatives if necessary. It is important not to ignore this problem, as bowel obstructions can be a serious complication after joint replacement surgery.  - Mood/Energy Level - Many patients experience a lack of energy and endurance for up to 2-3 months after surgery.  Some may also feel down and can even become depressed.  This is likely due to the postoperative anemia, change in activity level, lack of sleep, pain medicine and just the emotional reaction to the surgery  itself that is a big disruption in a person’s life.  This usually passes.  If symptoms persist, follow up with your primary physician.  - Returning to work - Your surgeon will give you more specific instructions. Depending on the type of activities you perform, it may be 6 to 8 weeks before you return to work.   Generally, if you work a sedentary job requiring little standing or walking, most patients may return within 2-6 weeks.  Manual labor jobs involving walking, lifting and standing may take longer. Your surgeon’s office can provide a release to part-time or light duty work early on in your recovery and progress you to full duty as able.    - Driving - If your left knee was replaced and you have an automatic transmission, you may be able to begin driving in a week or so, provided you are no longer taking narcotic pain medication. If your right knee was replaced, avoid driving for 6 to 8 weeks. Remember that your reflexes may not be as sharp as before your surgery. Ask your surgeon for specific instructions.   - Avoiding falls - A fall during the first few weeks after surgery can damage your new knee and may result in a need for further surgery.   throw rugs and tack down loose carpeting.  Be aware of floor hazards such as pets, small objects or uneven surfaces.    - [employer] Metal Detectors - The sensitivity of metal detectors varies and it is likely that your prosthesis will cause an alarm.  Inform the  of your artificial joint.  Diet  - Resume your normal diet as tolerated.  - It is important to achieve a healthy nutritional status by eating a well balanced diet on a regular basis.  - Your physician may recommend that you take iron and vitamin supplements.   - Continue to drink plenty of fluids.  Shower/Bathing  - You may shower as soon as you get home from the hospital unless otherwise instructed.  - Keep your incision out of water.  To keep the incision dry when showering, cover it  with a plastic bag or plastic wrap.  - Pat incision dry if it gets wet.  Don’t rub.  - Do not submerge in a bath until staples are out and the incision is completely healed. (Approximately 6-8 weeks)  Dressing Change:  Procedure (if recommended by your physician)  - Wash hands.  - Open all new dressing change materials.  - Remove old dressing and discard.  - Inspect incision for redness, increase in clear drainage, yellow/green drainage, odor and surrounding skin hot to touch.  -  ABD (large gauze) pad or “island dressing” by one corner and lay over the incision.  Be careful not to touch the inside of the dressing that will lay over the incision.  - Secure in place as instructed (Ace wrap or tape).    Swelling/Bruising    - Swelling can last from 3-6 months.  - Elevate your leg higher than your heart while reclining.   The first week you are home you should elevate your leg an equal amount of time, as you are active.    - Anti-inflammatory pills can be taken once you have stopped the blood thinners.  - The swelling is usually worse after you go home since you are upright for longer periods of time.  - Bruising is common and can involve the entire leg including the thigh, calf and even foot. Bruising often does not appear until after you arrive home and it can be quite dramatic- purple, black, and green.  The bruising you can see is not usually concerning and will subside without any treatment.      Blood Clot Prevention  Blood clots in the legs and the less common, but frightening, clots that travel to the lungs are a real focus of our preventative. Most patients are at standard risk for them, but those patients who are at higher risk include people who have had previous clots, a family history of clotting, smoking, diabetes, obesity, advanced age, use of estrogen and a sedentary lifestyle.    - Signs of blood clots in legs - Swelling in thigh, calf or ankle that does not go down with elevation.  Pain, heat  and tenderness in calf, back of calf or groin area.  NOTE: blood clots can occur in either leg.  - You have been receiving anticoagulant therapy (blood thinners) in the hospital and you may be instructed to continue at home depending on your risk factors.  - Your risk for developing a clot continues for up to 2-3 months after surgery.  You should avoid prolonged sitting and dehydration during that time (long air trips and car trips).  If you do take a trip during this time, please get up and move around every 1- 1.5 hours.  - If you are prescribed blood-thinning medication for home, follow instructions as directed. (Handouts provided if applicable).      Activity  Once home, you should continue to stay active. The key is to remember not to overdo it! While you can expect some good days and some bad days, you should notice a gradual improvement and a gradual increase in your endurance over the next 6 to 12 months. Exercise is a critical component of home care, particularly during the first few weeks after surgery.     - Normal activities of daily living You should be able to resume most within 3 to 6 weeks following surgery. Some pain with activity and at night is common for several weeks after surgery  -  Physical Therapy Exercises - Continue to do the exercises prescribed for at least two months after surgery. Riding a stationary bicycle can help maintain muscle tone and keep your knee flexible. Try to achieve the maximum degree of bending and extension possible. (handout provided by Therapist).  - Sexual Activity -. Your surgeon can tell you when it safe to resume sexual activity.    - Sleeping Positions - You can safely sleep on your back, on either side, or on your stomach.   - Other Activities - Walk as much as you like, but remember that walking is no substitute for the exercises your doctor and physical therapist will prescribe. Lower impact activities are preferred.  If you have specific questions, consult  "your Surgeon.    When to Call the Doctor   Call the physician if:   - Fever over 100.5? F  - Increased pain, drainage, redness, odor or heat around the incision area  - Shaking chills  - Increased knee pain with activity and rest  - Increased pain in calf, tenderness or redness above or below the knee  - Increased swelling of calf, ankle, foot  - Sudden increased shortness of breath, sudden onset of chest pain, localized chest pain with coughing  - Incision opening  Or, if there are any questions or concerns about medications or care.       -Is this patient being discharged with medication to prevent blood clots?  No    · Is patient discharged on Warfarin / Coumadin?   No     · Is patient Post Blood Transfusion?  No    PICC Home Guide  A peripherally inserted central catheter (PICC) is a long, thin, flexible tube that is inserted into a vein in the upper arm. It is a form of intravenous (IV) access. It is considered to be a \"central\" line because the tip of the PICC ends in a large vein in your chest. This large vein is called the superior vena cava (SVC). The PICC tip ends in the SVC because there is a lot of blood flow in the SVC. This allows medicines and IV fluids to be quickly distributed throughout the body. The PICC is inserted using a sterile technique by a specially trained nurse or physician. After the PICC is inserted, a chest X-ray exam is done to be sure it is in the correct place.   A PICC may be placed for different reasons, such as:  · To give medicines and liquid nutrition that can only be given through a central line. Examples are:  ¨ Certain antibiotic treatments.  ¨ Chemotherapy.  ¨ Total parenteral nutrition (TPN).  · To take frequent blood samples.  · To give IV fluids and blood products.  · If there is difficulty placing a peripheral intravenous (PIV) catheter.  If taken care of properly, a PICC can remain in place for several months. A PICC can also allow a person to go home from the hospital " "early. Medicine and PICC care can be managed at home by a family member or home health care team.  WHAT PROBLEMS CAN HAPPEN WHEN I HAVE A PICC?  Problems with a PICC can occasionally occur. These may include the following:  · A blood clot (thrombus) forming in or at the tip of the PICC. This can cause the PICC to become clogged. A clot-dissolving medicine called tissue plasminogen activator (tPA) can be given through the PICC to help break up the clot.  · Inflammation of the vein (phlebitis) in which the PICC is placed. Signs of inflammation may include redness, pain at the insertion site, red streaks, or being able to feel a \"cord\" in the vein where the PICC is located.  · Infection in the PICC or at the insertion site. Signs of infection may include fever, chills, redness, swelling, or pus drainage from the PICC insertion site.  · PICC movement (malposition). The PICC tip may move from its original position due to excessive physical activity, forceful coughing, sneezing, or vomiting.  · A break or cut in the PICC. It is important to not use scissors near the PICC.  · Nerve or tendon irritation or injury during PICC insertion.  WHAT SHOULD I KEEP IN MIND ABOUT ACTIVITIES WHEN I HAVE A PICC?  · You may bend your arm and move it freely. If your PICC is near or at the bend of your elbow, avoid activity with repeated motion at the elbow.  · Rest at home for the remainder of the day following PICC line insertion.  · Avoid lifting heavy objects as instructed by your health care provider.  · Avoid using a crutch with the arm on the same side as your PICC. You may need to use a walker.  WHAT SHOULD I KNOW ABOUT MY PICC DRESSING?  · Keep your PICC bandage (dressing) clean and dry to prevent infection.  ¨ Ask your health care provider when you may shower. Ask your health care provider to teach you how to wrap the PICC when you do take a shower.  · Change the PICC dressing as instructed by your health care provider.  · Change " "your PICC dressing if it becomes loose or wet.  WHAT SHOULD I KNOW ABOUT PICC CARE?  · Check the PICC insertion site daily for leakage, redness, swelling, or pain.  · Do not take a bath, swim, or use hot tubs when you have a PICC. Cover PICC line with clear plastic wrap and tape to keep it dry while showering.  · Flush the PICC as directed by your health care provider. Let your health care provider know right away if the PICC is difficult to flush or does not flush. Do not use force to flush the PICC.  · Do not use a syringe that is less than 10 mL to flush the PICC.  · Never pull or tug on the PICC.  · Avoid blood pressure checks on the arm with the PICC.  · Keep your PICC identification card with you at all times.  · Do not take the PICC out yourself. Only a trained clinical professional should remove the PICC.  SEEK IMMEDIATE MEDICAL CARE IF:  · Your PICC is accidentally pulled all the way out. If this happens, cover the insertion site with a bandage or gauze dressing. Do not throw the PICC away. Your health care provider will need to inspect it.  · Your PICC was tugged or pulled and has partially come out. Do not  push the PICC back in.  · There is any type of drainage, redness, or swelling where the PICC enters the skin.  · You cannot flush the PICC, it is difficult to flush, or the PICC leaks around the insertion site when it is flushed.  · You hear a \"flushing\" sound when the PICC is flushed.  · You have pain, discomfort, or numbness in your arm, shoulder, or jaw on the same side as the PICC.  · You feel your heart \"racing\" or skipping beats.  · You notice a hole or tear in the PICC.  · You develop chills or a fever.  MAKE SURE YOU:   · Understand these instructions.  · Will watch your condition.  · Will get help right away if you are not doing well or get worse.     This information is not intended to replace advice given to you by your health care provider. Make sure you discuss any questions you have with " your health care provider.     Document Released: 06/23/2004 Document Revised: 01/08/2016 Document Reviewed: 08/25/2014  [employer] Patient Education ©2016 [employer]      Depression / Suicide Risk    As you are discharged from this [facility] facility, it is important to learn how to keep safe from harming yourself.    Recognize the warning signs:  · Abrupt changes in personality, positive or negative- including increase in energy   · Giving away possessions  · Change in eating patterns- significant weight changes-  positive or negative  · Change in sleeping patterns- unable to sleep or sleeping all the time   · Unwillingness or inability to communicate  · Depression  · Unusual sadness, discouragement and loneliness  · Talk of wanting to die  · Neglect of personal appearance   · Rebelliousness- reckless behavior  · Withdrawal from people/activities they love  · Confusion- inability to concentrate     If you or a loved one observes any of these behaviors or has concerns about self-harm, here's what you can do:  · Talk about it- your feelings and reasons for harming yourself  · Remove any means that you might use to hurt yourself (examples: pills, rope, extension cords, firearm)  · Get professional help from the community (Mental Health, Substance Abuse, psychological counseling)  · Do not be alone:Call your Safe Contact- someone whom you trust who will be there for you.  · Call your local CRISIS HOTLINE 932-5215 or 815-937-3758  · Call your local Children's Mobile Crisis Response Team [place] (693) 827-4761 or www.[employer]  · Call the toll free National Suicide Prevention Hotlines   · National Suicide Prevention Lifeline 966-855-XCGP (0078)  · National Hope Line Network 800-SUICIDE (033-5119)        Follow-up Information     1. Follow up with Huang Cat M.D.. Schedule an appointment as soon as possible for a visit in 2 weeks.    Specialty:  Orthopaedics    Contact information     [address] [place] [address]  [phone]           Discharge Medication Instructions:    Below are the medications your physician expects you to take upon discharge:    Review all your home medications and newly ordered medications with your doctor and/or pharmacist. Follow medication instructions as directed by your doctor and/or pharmacist.    Please keep your medication list with you and share with your physician.               Medication List      START taking these medications        Instructions    Morning Afternoon Evening Bedtime    acetaminophen 325 MG Tabs   Last time this was given:  650 mg on 6/17/2017  4:22 AM   Commonly known as:  TYLENOL        Take 2 Tabs by mouth every 6 hours as needed (Mild Pain; (Pain scale 1-3); Temp greater than 100.5 F).   Dose:  650 mg                        heparin 5000 UNIT/ML Soln   Last time this was given:  5,000 Units on 6/20/2017  9:50 AM        Inject 1 mL as instructed every 12 hours.   Dose:  5000 Units                        NS SOLN 100 mL with cefTRIAXone 2 GM SOLR 2 g   Last time this was given:  2 g on 6/20/2017  9:50 AM        2 g by Intravenous route every 24 hours for 27 days.   Dose:  2 g                        ondansetron 4 MG Tbdp   Commonly known as:  ZOFRAN ODT        Take 1 Tab by mouth every four hours as needed for Nausea/Vomiting (give PO if IV route is unavailable. May give per feeding tube.).   Dose:  4 mg                        polyethylene glycol/lytes Pack   Last time this was given:  1 Packet on 6/13/2017  1:57 PM   Commonly known as:  MIRALAX        Take 1 Packet by mouth every day.   Dose:  17 g                          CHANGE how you take these medications        Instructions    Morning Afternoon Evening Bedtime    timolol 0.25 % Soln   What changed:  when to take this   Last time this was given:  1 Drop on 6/20/2017  9:51 AM   Commonly known as:  TIMOPTIC        Place 1 Drop in both eyes every day.   Dose:  1 Drop                             CONTINUE taking these medications        Instructions    Morning Afternoon Evening Bedtime    latanoprost 0.005 % Soln   Last time this was given:  1 Drop on 6/19/2017  7:38 PM   Commonly known as:  XALATAN        Place 1 Drop in both eyes every evening.   Dose:  1 Drop                             Where to Get Your Medications      Information about where to get these medications is not yet available     ! Ask your nurse or doctor about these medications    - acetaminophen 325 MG Tabs  - heparin 5000 UNIT/ML Soln  - NS SOLN 100 mL with cefTRIAXone 2 GM SOLR 2 g  - ondansetron 4 MG Tbdp  - polyethylene glycol/lytes Pack            Orders for after discharge     REFERRAL TO SKILLED NURSING FACILITY    Complete by:  As directed              Instructions           Diet / Nutrition:    Follow any diet instructions given to you by your doctor or the dietician, including how much salt (sodium) you are allowed each day.    If you are overweight, talk to your doctor about a weight reduction plan.    Activity:    Remain physically active following your doctor's instructions about exercise and activity.    Rest often.     Any time you become even a little tired or short of breath, SIT DOWN and rest.    Worsening Symptoms:    Report any of the following signs and symptoms to the doctor's office immediately:    *Pain of jaw, arm, or neck  *Chest pain not relieved by medication                               *Dizziness or loss of consciousness  *Difficulty breathing even when at rest   *More tired than usual                                       *Bleeding drainage or swelling of surgical site  *Swelling of feet, ankles, legs or stomach                 *Fever (>100ºF)  *Pink or blood tinged sputum  *Weight gain (3lbs/day or 5lbs /week)           *Shock from internal defibrillator (if applicable)  *Palpitations or irregular heartbeats                *Cool and/or numb extremities    Stroke Awareness    Common Risk Factors for  Stroke include:    Age  Atrial Fibrillation  Carotid Artery Stenosis  Diabetes Mellitus  Excessive alcohol consumption  High blood pressure  Overweight   Physical inactivity  Smoking    Warning signs and symptoms of a stroke include:    *Sudden numbness or weakness of the face, arm or leg (especially on one side of the body).  *Sudden confusion, trouble speaking or understanding.  *Sudden trouble seeing in one or both eyes.  *Sudden trouble walking, dizziness, loss of balance or coordination.Sudden severe headache with no known cause.    It is very important to get treatment quickly when a stroke occurs. If you experience any of the above warning signs, call 911 immediately.                   Disclaimer         Quit Smoking / Tobacco Use:    I understand the use of any tobacco products increases my chance of suffering from future heart disease or stroke and could cause other illnesses which may shorten my life. Quitting the use of tobacco products is the single most important thing I can do to improve my health. For further information on smoking / tobacco cessation call a Toll Free Quit Line at 1-991.212.4171 (*National Cancer [place]) or 1-357.600.8074 (American Lung Association) or you can access the web based program at www.lungusa.org.    [place] Tobacco Users Help Line:  (403) 634-1294       Toll Free: 1-951.196.6362  Quit Tobacco Program [facility] (117)992-1103    Crisis Hotline:    [place] Crisis Hotline:  0-546-JJUOOGH or 1-118.731.6202    [place] Crisis Hotline:    1-419.936.4622 or 990-511-3626    Discharge Survey:   Thank you for choosing [facility]. We hope we did everything we could to make your hospital stay a pleasant one. You may be receiving a phone survey and we would appreciate your time and participation in answering the questions. Your input is very valuable to us in our efforts to improve our service to our patients and their families.        My signature on this  form indicates that:    1. I have reviewed and understand the above information.  2. My questions regarding this information have been answered to my satisfaction.  3. I have formulated a plan with my discharge nurse to obtain my prescribed medications for home.                  Disclaimer         __________________________________                     __________       ________                       Patient Signature                                                 Date                    Time

## 2017-06-11 NOTE — ED NOTES
Med Rec completed per patient  Allergies reviewed  No ORAL antibiotics in last 30 days    Deleted Coumadin, patient has not taken this medication is well over a year

## 2017-06-12 LAB
ANION GAP SERPL CALC-SCNC: 6 MMOL/L (ref 0–11.9)
BASOPHILS # BLD AUTO: 0 % (ref 0–1.8)
BASOPHILS # BLD: 0 K/UL (ref 0–0.12)
BUN SERPL-MCNC: 23 MG/DL (ref 8–22)
CALCIUM SERPL-MCNC: 8.2 MG/DL (ref 8.5–10.5)
CHLORIDE SERPL-SCNC: 108 MMOL/L (ref 96–112)
CO2 SERPL-SCNC: 21 MMOL/L (ref 20–33)
CREAT SERPL-MCNC: 1.22 MG/DL (ref 0.5–1.4)
EOSINOPHIL # BLD AUTO: 0 K/UL (ref 0–0.51)
EOSINOPHIL NFR BLD: 0 % (ref 0–6.9)
ERYTHROCYTE [DISTWIDTH] IN BLOOD BY AUTOMATED COUNT: 49.4 FL (ref 35.9–50)
GFR SERPL CREATININE-BSD FRML MDRD: 59 ML/MIN/1.73 M 2
GLUCOSE SERPL-MCNC: 155 MG/DL (ref 65–99)
GRAM STN SPEC: NORMAL
HCT VFR BLD AUTO: 39.4 % (ref 42–52)
HGB BLD-MCNC: 12.9 G/DL (ref 14–18)
LACTATE BLD-SCNC: 1.6 MMOL/L (ref 0.5–2)
LYMPHOCYTES # BLD AUTO: 0.39 K/UL (ref 1–4.8)
LYMPHOCYTES NFR BLD: 2.6 % (ref 22–41)
MANUAL DIFF BLD: ABNORMAL
MCH RBC QN AUTO: 31 PG (ref 27–33)
MCHC RBC AUTO-ENTMCNC: 32.7 G/DL (ref 33.7–35.3)
MCV RBC AUTO: 94.7 FL (ref 81.4–97.8)
MONOCYTES # BLD AUTO: 0.13 K/UL (ref 0–0.85)
MONOCYTES NFR BLD AUTO: 0.9 % (ref 0–13.4)
MORPHOLOGY BLD-IMP: NORMAL
NEUTROPHILS # BLD AUTO: 14.38 K/UL (ref 1.82–7.42)
NEUTROPHILS NFR BLD: 83.3 % (ref 44–72)
NEUTS BAND NFR BLD MANUAL: 13.2 % (ref 0–10)
NRBC # BLD AUTO: 0 K/UL
NRBC BLD AUTO-RTO: 0 /100 WBC
PLATELET # BLD AUTO: 97 K/UL (ref 164–446)
PLATELET BLD QL SMEAR: NORMAL
PMV BLD AUTO: 11.6 FL (ref 9–12.9)
POTASSIUM SERPL-SCNC: 4.8 MMOL/L (ref 3.6–5.5)
RBC # BLD AUTO: 4.16 M/UL (ref 4.7–6.1)
RBC BLD AUTO: NORMAL
SIGNIFICANT IND 70042: NORMAL
SITE SITE: NORMAL
SODIUM SERPL-SCNC: 135 MMOL/L (ref 135–145)
SOURCE SOURCE: NORMAL
VANCOMYCIN TROUGH SERPL-MCNC: 23.3 UG/ML (ref 10–20)
WBC # BLD AUTO: 14.9 K/UL (ref 4.8–10.8)

## 2017-06-12 PROCEDURE — A9270 NON-COVERED ITEM OR SERVICE: HCPCS | Performed by: INTERNAL MEDICINE

## 2017-06-12 PROCEDURE — 700105 HCHG RX REV CODE 258

## 2017-06-12 PROCEDURE — G8988 SELF CARE GOAL STATUS: HCPCS | Mod: CI

## 2017-06-12 PROCEDURE — 700101 HCHG RX REV CODE 250: Performed by: INTERNAL MEDICINE

## 2017-06-12 PROCEDURE — 83605 ASSAY OF LACTIC ACID: CPT

## 2017-06-12 PROCEDURE — 0SWV0JZ REVISION OF SYNTHETIC SUBSTITUTE IN RIGHT KNEE JOINT, TIBIAL SURFACE, OPEN APPROACH: ICD-10-PCS | Performed by: ORTHOPAEDIC SURGERY

## 2017-06-12 PROCEDURE — 80202 ASSAY OF VANCOMYCIN: CPT

## 2017-06-12 PROCEDURE — 700102 HCHG RX REV CODE 250 W/ 637 OVERRIDE(OP): Performed by: INTERNAL MEDICINE

## 2017-06-12 PROCEDURE — 85027 COMPLETE CBC AUTOMATED: CPT

## 2017-06-12 PROCEDURE — 770006 HCHG ROOM/CARE - MED/SURG/GYN SEMI*

## 2017-06-12 PROCEDURE — 0SBD0ZZ EXCISION OF LEFT KNEE JOINT, OPEN APPROACH: ICD-10-PCS | Performed by: ORTHOPAEDIC SURGERY

## 2017-06-12 PROCEDURE — 97165 OT EVAL LOW COMPLEX 30 MIN: CPT

## 2017-06-12 PROCEDURE — 85007 BL SMEAR W/DIFF WBC COUNT: CPT

## 2017-06-12 PROCEDURE — 0S9D0ZX DRAINAGE OF LEFT KNEE JOINT, OPEN APPROACH, DIAGNOSTIC: ICD-10-PCS | Performed by: ORTHOPAEDIC SURGERY

## 2017-06-12 PROCEDURE — 700105 HCHG RX REV CODE 258: Performed by: INTERNAL MEDICINE

## 2017-06-12 PROCEDURE — 36415 COLL VENOUS BLD VENIPUNCTURE: CPT

## 2017-06-12 PROCEDURE — 700111 HCHG RX REV CODE 636 W/ 250 OVERRIDE (IP): Performed by: ORTHOPAEDIC SURGERY

## 2017-06-12 PROCEDURE — G8987 SELF CARE CURRENT STATUS: HCPCS | Mod: CJ

## 2017-06-12 PROCEDURE — 700111 HCHG RX REV CODE 636 W/ 250 OVERRIDE (IP)

## 2017-06-12 PROCEDURE — 99232 SBSQ HOSP IP/OBS MODERATE 35: CPT | Performed by: HOSPITALIST

## 2017-06-12 PROCEDURE — 700111 HCHG RX REV CODE 636 W/ 250 OVERRIDE (IP): Performed by: INTERNAL MEDICINE

## 2017-06-12 PROCEDURE — 80048 BASIC METABOLIC PNL TOTAL CA: CPT

## 2017-06-12 PROCEDURE — 51798 US URINE CAPACITY MEASURE: CPT

## 2017-06-12 RX ORDER — HEPARIN SODIUM 5000 [USP'U]/ML
5000 INJECTION, SOLUTION INTRAVENOUS; SUBCUTANEOUS EVERY 12 HOURS
Status: DISCONTINUED | OUTPATIENT
Start: 2017-06-12 | End: 2017-06-20 | Stop reason: HOSPADM

## 2017-06-12 RX ORDER — SODIUM CHLORIDE 9 MG/ML
INJECTION, SOLUTION INTRAVENOUS CONTINUOUS
Status: DISCONTINUED | OUTPATIENT
Start: 2017-06-12 | End: 2017-06-13

## 2017-06-12 RX ADMIN — VANCOMYCIN HYDROCHLORIDE 1500 MG: 100 INJECTION, POWDER, LYOPHILIZED, FOR SOLUTION INTRAVENOUS at 08:44

## 2017-06-12 RX ADMIN — SODIUM CHLORIDE: 9 INJECTION, SOLUTION INTRAVENOUS at 13:23

## 2017-06-12 RX ADMIN — LATANOPROST 1 DROP: 50 SOLUTION OPHTHALMIC at 01:07

## 2017-06-12 RX ADMIN — ACETAMINOPHEN 650 MG: 325 TABLET, FILM COATED ORAL at 08:28

## 2017-06-12 RX ADMIN — AMPICILLIN SODIUM AND SULBACTAM SODIUM 3 G: 2; 1 INJECTION, POWDER, FOR SOLUTION INTRAMUSCULAR; INTRAVENOUS at 11:30

## 2017-06-12 RX ADMIN — LATANOPROST 1 DROP: 50 SOLUTION OPHTHALMIC at 19:57

## 2017-06-12 RX ADMIN — AMPICILLIN SODIUM AND SULBACTAM SODIUM 3 G: 2; 1 INJECTION, POWDER, FOR SOLUTION INTRAMUSCULAR; INTRAVENOUS at 17:46

## 2017-06-12 RX ADMIN — VANCOMYCIN HYDROCHLORIDE 1500 MG: 100 INJECTION, POWDER, LYOPHILIZED, FOR SOLUTION INTRAVENOUS at 19:55

## 2017-06-12 RX ADMIN — STANDARDIZED SENNA CONCENTRATE AND DOCUSATE SODIUM 2 TABLET: 8.6; 5 TABLET, FILM COATED ORAL at 19:54

## 2017-06-12 RX ADMIN — TIMOLOL MALEATE 1 DROP: 2.5 SOLUTION OPHTHALMIC at 01:08

## 2017-06-12 RX ADMIN — HEPARIN SODIUM 5000 UNITS: 5000 INJECTION, SOLUTION INTRAVENOUS; SUBCUTANEOUS at 19:54

## 2017-06-12 RX ADMIN — STANDARDIZED SENNA CONCENTRATE AND DOCUSATE SODIUM 2 TABLET: 8.6; 5 TABLET, FILM COATED ORAL at 09:00

## 2017-06-12 RX ADMIN — TIMOLOL MALEATE 1 DROP: 2.5 SOLUTION OPHTHALMIC at 19:54

## 2017-06-12 RX ADMIN — TIMOLOL MALEATE 1 DROP: 2.5 SOLUTION OPHTHALMIC at 08:44

## 2017-06-12 RX ADMIN — ACETAMINOPHEN 650 MG: 325 TABLET, FILM COATED ORAL at 19:54

## 2017-06-12 RX ADMIN — HEPARIN SODIUM 5000 UNITS: 5000 INJECTION, SOLUTION INTRAVENOUS; SUBCUTANEOUS at 13:23

## 2017-06-12 RX ADMIN — SODIUM CHLORIDE: 9 INJECTION, SOLUTION INTRAVENOUS at 01:06

## 2017-06-12 RX ADMIN — AMPICILLIN SODIUM AND SULBACTAM SODIUM 3 G: 2; 1 INJECTION, POWDER, FOR SOLUTION INTRAMUSCULAR; INTRAVENOUS at 02:19

## 2017-06-12 ASSESSMENT — COGNITIVE AND FUNCTIONAL STATUS - GENERAL
TOILETING: A LITTLE
SUGGESTED CMS G CODE MODIFIER DAILY ACTIVITY: CJ
HELP NEEDED FOR BATHING: A LITTLE
DRESSING REGULAR LOWER BODY CLOTHING: A LOT
DAILY ACTIVITIY SCORE: 20

## 2017-06-12 ASSESSMENT — ENCOUNTER SYMPTOMS
MYALGIAS: 1
COUGH: 0
FLANK PAIN: 0
SENSORY CHANGE: 0
ABDOMINAL PAIN: 0
FEVER: 1
HEADACHES: 0
PALPITATIONS: 0
FOCAL WEAKNESS: 0
SHORTNESS OF BREATH: 0
SPEECH CHANGE: 0
CHILLS: 1
VOMITING: 0

## 2017-06-12 ASSESSMENT — PAIN SCALES - GENERAL
PAINLEVEL_OUTOF10: 0
PAINLEVEL_OUTOF10: 0
PAINLEVEL_OUTOF10: 2

## 2017-06-12 ASSESSMENT — ACTIVITIES OF DAILY LIVING (ADL): TOILETING: INDEPENDENT

## 2017-06-12 NOTE — THERAPY
"Occupational Therapy Evaluation completed.   Functional Status:  Min A supine > EOB, SBA transfers with FWW, max A LB dressing without AE, CGA standing grooming   Plan of Care: Will benefit from Occupational Therapy 3 times per week  Discharge Recommendations:  Equipment: No Equipment Needed. Post-acute therapy Discharge to home with outpatient or home health for additional skilled therapy services.    See \"Rehab Therapy-Acute\" Patient Summary Report for complete documentation.    67 y.o. male with h/o L TKA revision 3 years ago for infection, presents now with L knee infection s/p I&D (WBAT). Pt seen for OT eval. Limited by pain, decreased AROM/motor control of LLE. Pt would benefit from acute OT to maximize functional independence and safety. He has all necessary DME in place and good support from spouse.     "

## 2017-06-12 NOTE — PROGRESS NOTES
Transport in to take pt to OR.  Family at bedside, pt on 4L by NC, IV s/l, pt a/a/o upon transport.

## 2017-06-12 NOTE — PROGRESS NOTES
DATE OF SERVICE:  06/11/2017    INTERVAL HISTORY:  I texted Dr. Lewis and has heard back from him regarding   management for this patient, recommend that an aspiration, as I suggested, for   the left knee.    PROCEDURE NOTE:  After informing the patient and his wife of the risks,   benefits, and alternatives, we agreed to move forward with a left knee   aspiration.  The area was prepped with a serial ChloraPrep.  After this, under   sterile conditions, the left knee was aspirated using sterile technique.    About 0.5 mL of cloudy pus-like fluid was returned.  This will be sent for   Gram stain, cell count, and aerobic and anaerobic culture count depending on   the quantity.    ASSESSMENT AND PLAN:  I will move forward at this time.  We will let the   patient eat today and make him n.p.o. after midnight for possible procedure   tomorrow, pending Dr. Lewis availability.       ____________________________________     MD SILVANO Lawrence / PHIL    DD:  06/11/2017 14:42:44  DT:  06/11/2017 19:57:55    D#:  [id number]  Job#:  [id number]

## 2017-06-12 NOTE — OR NURSING
Report called to Katrin MARIEE. Patient reporting tolerable 1/10 discomfort in left knee. Ice pack in place. Moving extremity without difficulty, strong pedal pulse palpated. Patient tolerating oral pain medication and water without complaints of nausea. Dozing intermittently with even respirations, no acute s/s of distress noted at this time.

## 2017-06-12 NOTE — PROGRESS NOTES
Pharmacy Kinetics 67 y.o. male on vancomycin day # 2 2017    Currently on Vancomycin 1500 mg IV q12h    Indication for Treatment: septic L knee     Pertinent history per medical record: Admitted on 2017 for L leg swelling and pain. Patient has a history of bilateral TKA, and has experienced similar episodes in the past with cultures demonstrating Group G Streptococcus. Patient received vancomycin 1000 mg IV PTA.     Other antibiotics: Unasyn 3 g IV q6h    Allergies: Review of patient's allergies indicates no known allergies.     List concerns for renal function: age, BMI ~36    Pertinent cultures to date:     PBC x 2: not yet collected   fluid culture: possible Strep    Recent Labs      17   1220  17   0145   WBC  17.1*  14.9*   NEUTSPOLYS  79.10*  83.30*   BANDSSTABS  18.30*  13.20*     Recent Labs      17   1220  17   0145   BUN  20  23*   CREATININE  1.15  1.22   ALBUMIN  3.5   --      No results for input(s): VANCOTROUGH, VANCOPEAK, VANCORANDOM in the last 72 hours.  Intake/Output Summary (Last 24 hours) at 17 0929  Last data filed at 17 0408   Gross per 24 hour   Intake   1600 ml   Output   1200 ml   Net    400 ml      Blood pressure 107/67, pulse 73, temperature 36.7 °C (98.1 °F), resp. rate 17, weight 117.935 kg (260 lb), SpO2 98 %. Temp (24hrs), Av.8 °C (98.3 °F), Min:35.7 °C (96.2 °F), Max:39.2 °C (102.6 °F)      A/P   1. Vancomycin dose change: continue 1500 mg IV q12h  2. Next vancomycin level: tonight @  3. Goal trough: 12-16 mcg/mL  4. Comments: Synovial fluid culture growing Strep. Pt to go to OR today for I&D of septic joint. Will check a trough level prior to tonight's dose and follow up result in AM. Renal indices slightly worse today, will trend. BMP with AM labs. Pharmacy will follow.    Dejan Wagner, KacieD

## 2017-06-12 NOTE — PROGRESS NOTES
DATE OF SERVICE:  06/11/2017    I have spoken with Dr. Lewis as well as the rep for the company several times   repeating this point.  It is going to be 2 days before we can get another   polyethylene liner.  In light of this, I think it is more important to get the   patient to the operating room to I and D the knee within 24 hours of onset of   pain.  I rather not wait 2 full days in order to get a true poly exchange.  At   this point, plan is to move forward to do a simple explant of the poly, thorough   debridement and washing including synovectomy, re-plan of the poly and   closure.  At this time, we will also do a synovectomy of the knee just to make   sure everything is clear.  I went over this with Dr. Lewis, who was in   agreement and we will move forward with this plan tonight.       ____________________________________     MD SILVANO Lawrence / PHIL    DD:  06/11/2017 17:13:48  DT:  06/11/2017 20:02:03    D#:  [id number]  Job#:  [id number]

## 2017-06-12 NOTE — OP REPORT
DATE OF SERVICE:  06/11/2017    TIME:  2100 hours.    PREOPERATIVE DIAGNOSIS:  Infected left total knee replacement.    POSTOPERATIVE DIAGNOSIS:  Infected left total knee replacement.    PROCEDURE:  Left total knee revision of patellar component, irrigation and   debridement.    SURGEON:  Huagn Cat MD.    ASSISTANT:  None.    ANESTHESIA:  General.    FINDINGS:  Copious pus within the left knee joint.    ESTIMATED BLOOD LOSS:  250 mL.    DRAINS:  PRITESH drain.    SPECIMEN:  Multiple cultures sent.    COMPLICATIONS:  None.    CONDITION:  Stable to PACU.    IMPLANTS:  Polyethylene liner, size 18.    INDICATIONS:  I have personally seen and evaluated the patient in the   emergency room.  We have diagnosed him with a left periprosthetic total knee   infection.  An arthrocentesis was done which yielded pus in emergency room.    The patient had less than 24 hours of symptoms, and so we think it is prudent   to get him for a watch out.  While there, we have debrided to take out the   polyethylene liner and revise that.  The patient expressed interest in the   operation and agreed to all risks, benefits and alternatives as well as proper   understanding of those.    I also discussed separately with Dr. Lewis who also agreed that it is a good   plan to move forward to do a 1-component revision on this patient.  The   patient's proposed incision site was marked by me in the preoperative holding   area.  The patient was then taken to the operating room in stable condition.    Following smooth induction of anesthesia, he was positioned on OR table with   all down surfaces padded.  He was then prepped and draped in the usual sterile   fashion.  Preoperative antibiotics were held for culture purposes.  Of note,   the patient did receive antibiotics from an outside institution before this.    A surgical timeout was then performed and all parties involved were in   agreement on the procedure.    APPROACH:  A standard medial  parapatellar approach was then taken using the   previous incision.  A midline approach was made after the tourniquet was   inflated.  Of note, total tourniquet time for this case was 60 minutes.  Using   a standard medial parapatellar approach, a small cuff tissue was left on the   patella and a mid vastus approach was then used.  Upon entering the knee   joint, copious pus was returned. This was then sent for culture.  Of note,   antibiotics were administered at this time.  There was a pus noted everywhere.    There was at least 50 mL noted in the knee joint itself.  Attention was then   turned towards the polyethylene liner.  The clip was then disengaged,   removed, and the polyethylene liner was taken out of the knee joint   completely.  At this time, the knee joint was irrigated with about 3 liters of   saline followed by a Betadine scrub of all components including a separate   scrub of the polyethylene liner itself.  Following this, an additional 3   liters followed by additional Betadine scrub with a separate brush followed by   an additional 3 liters of saline.  In total, 9 liters of saline was entered   through the joint and was returned clear at the end without any evidence of   pus.  Before the irrigation was performed, a complete synovectomy was   performed.  All synovium was removed off of the bone as well as underneath the   tendons.  There was noted to be no smooth surfaces while remaining upon   termination of the complete synovectomy.  Of note, in order to allow proper   eversion of the patella, a small osteophyte was removed from the lateral   aspect of the patella.  Knee was noted to be quite stiff after, again, many   revisions.  Of note, the patellar tendon was noted to be intact.  A small part   of the capsule was also teased from the proximal medial tibia to allow for   proper visualization as well.  Following all of this debridement and washout,   a polyethylene liner was then reinserted  back into the knee and was located   easily.  A brand new locking mechanism was then placed on the polyethylene   liner and noted to track well.  The knee was noted to be stable and varus   valgus and had 0 to 120 degrees of range of motion.  At this time, attention   was turned towards closure.    Closure:  #1 Vicryl antibiotic-impregnated plus sutures were then used to   close the quadriceps tendon in layers.  This closed the entire arthrotomy top   to bottom.  There was good approximation of all tissues at this time.  2-0   Vicryl sutures were then used to reapproximate subdermal area and staples were   used to reapproximate the skin.  PRITESH dressing was then placed on top of this   sterilely.  The patient was then awoken uneventfully in the recovery room.    The patient would be indicated for VTE prophylaxis with heparin or Lovenox   while he was in the hospital.  Cultures would be followed and further care   would be deferred to Dr. Odonnell for the remainder of this hospitalization.  I   will discuss the outcome of this case personally with Dr. Blas Lewis and   let him know how everything went.       ____________________________________     MD SILVANO Lawrence / NTS    DD:  06/12/2017 08:47:56  DT:  06/12/2017 10:14:48    D#:  [id number]  Job#:  [id number]

## 2017-06-12 NOTE — OR NURSING
Patient off unit with transport, wife at bedside getting update from Dr Cat. Patient dozing intermittently with even respirations.

## 2017-06-12 NOTE — PROGRESS NOTES
[facility]  [address]  [address]  [place] [address]  Phone: [phone]  Fax: [phone]                  aMyra Mejia   2017 2:30 PM   Office Visit    Description:  Female : 1968   Provider:  Iza Patel MD   Department:  [facility]           Reason for Visit     Fibromyalgia           Diagnoses this Visit        Comments    Muscle spasm    -  Primary     Fibromyalgia         Chronic neck pain         Isolated cervical dystonia         DDD (degenerative disc disease), lumbar         Arthralgia of both knees         Tenosynovitis of hand                To Do List           Future Appointments        Provider Department Dept Phone    2017 1:00 PM Angelica Shah MD [facility] [phone]    3/14/2017 11:15 AM Monty Mart MD [facility] [phone]      Goals (5 Years of Data)     None      Follow-Up and Disposition     Return in about 6 months (around 2017).       These Medications        Disp Refills Start End    baclofen (LIORESAL) 10 MG tablet 90 tablet 11 2017     Take 1 tablet (10 mg total) by mouth 3 (three) times daily. - Oral    Pharmacy: [address] Ph #: [phone]       diclofenac (VOLTAREN) 75 MG EC tablet 60 tablet 11 2017     Take 1 tablet (75 mg total) by mouth 2 (two) times daily. Take with food. Stop if any stomach irritation - Oral    Pharmacy: [address] Ph #: [phone]         [facility]     [facility] On Call Nurse Care Line -  Assistance  Registered nurses in the [facility] provide clinical advisement, health education, appointment booking, and other advisory services.  Call for this free service at [phone].             Medications           Message regarding Medications     Verify the changes and/or additions to your medication regime listed below  Nurse notified MD, fluid obtained from patient left knee positive for heavy growth beta strep g.    are the same as discussed with your clinician today.  If any of these changes or additions are incorrect, please notify your healthcare provider.             Verify that the below list of medications is an accurate representation of the medications you are currently taking.  If none reported, the list may be blank. If incorrect, please contact your healthcare provider. Carry this list with you in case of emergency.           Current Medications     acyclovir 5% (ZOVIRAX) 5 % ointment Apply topically 6 (six) times daily.    albuterol (PROVENTIL HFA/VENTOLIN HFA) 200 puff inhaler Inhale 2 puffs into the lungs every 6 (six) hours as needed.      ascorbic acid (VITAMIN C) 500 MG tablet Take 1 tablet by mouth Daily.    baclofen (LIORESAL) 10 MG tablet Take 1 tablet (10 mg total) by mouth 3 (three) times daily.    BRINTELLIX 20 mg Tab     calcium carbonate-vitamin D3 (CALCIUM 600 + D,3,) 600 mg calcium- 200 unit Cap Take 1 tablet by mouth Daily.    cetirizine (ZYRTEC) 10 MG tablet Take 10 mg by mouth daily    coenzyme Q10 200 mg capsule Take 1 capsule by mouth Twice daily.    cyanocobalamin (VITAMIN B-12) 1,000 mcg/mL injection Inject 1 mL (1,000 mcg total) into the muscle every 30 days. Dispense with #10 25 gague 1 inch needles and #10 one ML syringes    cyanocobalamin 500 MCG tablet Take 1,000 mcg by mouth Daily.     desog-e.estradiol/e.estradiol (KARIVA) 0.15-0.02 mgx21 /0.01 mg x 5 per tablet Take 1 tablet by mouth once daily.    diazepam (VALIUM) 10 MG Tab Take 10 mg by mouth 3 (three) times daily with meals    diclofenac (VOLTAREN) 75 MG EC tablet Take 1 tablet (75 mg total) by mouth 2 (two) times daily. Take with food. Stop if any stomach irritation    dicyclomine (BENTYL) 10 MG capsule Take 10 mg by mouth 3 (three) times daily as needed  for Cramping    FERROUS SULFATE, DRIED (IRON, DRIED, ORAL) Take by mouth once daily.    furosemide (LASIX) 40 MG tablet Take 1 tablet (40 mg total) by mouth once daily.     "hydrocodone-acetaminophen 5-325mg (NORCO) 5-325 mg per tablet Take 1 tablet by mouth 2 (two) times daily.    INVEGA 6 mg TR24 Take 6 mg by mouth 2 (two) times daily.     loratadine (CLARITIN) 10 mg tablet     lysine (L-LYSINE) 500 mg Tab Take 1 tablet by mouth Daily.    magnesium 250 mg Tab Take 1 tablet by mouth Twice daily.    montelukast (SINGULAIR) 10 mg tablet Take 1 tablet by mouth Daily.    MULTIVITAMIN ORAL Daily.    omeprazole (PRILOSEC) 40 MG capsule Take 1 capsule (40 mg total) by mouth every morning.    pantoprazole (PROTONIX) 40 MG tablet Take 1 tablet (40 mg total) by mouth once daily.    promethazine (PHENERGAN) 25 MG tablet Take 0.5-1 tablets (12.5-25 mg total) by mouth every 4 to 6 hours as needed.    ranitidine (ZANTAC) 300 MG tablet Take 1 tablet (300 mg total) by mouth nightly.    sumatriptan (IMITREX STATDOSE) 6 mg/0.5 mL kit Inject 0.5 mLs (6 mg total) into the skin every 2 (two) hours as needed for Migraine.    sumatriptan (IMITREX) 100 MG tablet Up to 200mg/day. Hold for bp > 150/100    valacyclovir (VALTREX) 500 MG tablet Take 1 tablet (500 mg total) by mouth once daily.    vitamin E 1000 UNIT capsule Take 1 capsule by mouth Daily.           Clinical Reference Information           Vital Signs - Last Recorded  Most recent update: 1/20/2017  2:37 PM by Lori Ibrahim MA    BP Pulse Ht Wt BMI    (!) 115/58 (BP Location: Left arm, Patient Position: Sitting, BP Method: Automatic) 87 5' 4" (1.626 m) 87.1 kg (192 lb) 32.96 kg/m2      Blood Pressure          Most Recent Value    BP  (!)  115/58      Allergies as of 1/20/2017     Adhesive    Sulfa (Sulfonamide Antibiotics)    Sulfacetamide Sodium    Sulfasalazine      Immunizations Administered on Date of Encounter - 1/20/2017     None      [facility] Sign-Up     Activating your [facility] account is as easy as 1-2-3!     1) Visit [email or web address], select Sign Up Now, enter this activation code and your date of birth, then select " Next.  [email or web address]  Expires: 1/28/2017  2:04 PM      2) Create a username and password to use when you visit [facility] in the future and select a security question in case you lose your password and select Next.    3) Enter your e-mail address and click Sign Up!    Additional Information  If you have questions, please e-mail [email or web address] or call [phone] to talk to our [facility] staff. Remember, [facility] is NOT to be used for urgent needs. For medical emergencies, dial 911.         Smoking Cessation     If you would like to quit smoking:   You may be eligible for free services if you are a [place] resident and started smoking cigarettes before September 1, 1988.  Call the Smoking Cessation Trust (SCT) toll free at ([phone] [phone] or [phone].   Call 9-675-QUIT-NOW if you do not meet the above criteria.

## 2017-06-12 NOTE — H&P
IDENTIFICATION:  The patient is a 67-year-old male from [place], [place].    CHIEF COMPLAINT:  Left knee pain and swelling for 1 day.    HISTORY OF PRESENT ILLNESS:  The patient is a 67-year-old male with a history   of infection in the left knee.  He has had hardware placed many years ago and   actually had the hardware removed.  He had several weeks of intravenous   antibiotics and then the hardware was replaced and he is followed by Dr. Blas Lewis for this.  He states that yesterday he started having some   increased swelling and pain in the knee.  Today, he has had subjective fevers   and chills and he comes to the hospital to be evaluated.  He denies any   nausea, vomiting, lightheadedness, dizziness.  He has had some redness in the   left lower leg as well.    REVIEW OF SYSTEMS:  A 10-point review of systems is reviewed and otherwise   negative.    PAST MEDICAL HISTORY:  Infected left knee with hardware in place and glaucoma.    OUTPATIENT MEDICATIONS:  Timolol 0.25% solution one drop in each eye twice   daily and latanoprost 0.005% solution 1 drop in each eye twice daily.    DRUG ALLERGIES:  None.    SOCIAL HISTORY:  Patient denies any tobacco use.  He drinks 3-4 alcoholic   beverages per week and lives with his wife outside of [place], [place].    FAMILY HISTORY:  Mother had colon cancer.  Father had diabetes and heart   disease.    PHYSICAL EXAMINATION:  VITAL SIGNS:  Temperature 98, blood pressure 102/74, heart rate 96,   respiratory rate 18, oxygen saturation 94% on 2 liters nasal cannula.  GENERAL:  Patient is a very pleasant gentleman.  He is sitting comfortably in   no apparent distress.  HEENT:  Head atraumatic.  Extraocular movements intact.  Pupils are equal,   round and reactive to light.  Sclerae are clear.  Conjunctivae are pink.    Mucous membranes are moist.  NECK:  Supple.  No jugular venous distention.  Patient has a large neck   circumference.  No anterior, posterior, cervical or  supraclavicular   lymphadenopathy.  HEART:  Regular rate and rhythm with no murmur.  Point of maximal impulse   nondisplaced.  LUNGS:  Clear to auscultation bilaterally, has good breath sounds to bases.  CHEST:  Symmetric with respiration.  ABDOMEN:  Soft, nontender, nondistended with normoactive bowel sounds and no   palpable hepatosplenomegaly.  EXTREMITIES:  Have bilateral scars over the knees consistent with prior knee   surgery, left knee is significantly more swollen than the right.  He has   increased warmth on palpation and some slight erythema to the skin extending   from over the left knee all the way down to just above the foot.  He has pedal   pulses that are 2+ bilaterally.  He has trace bilateral lower extremity   edema.  NEUROLOGIC:  Patient is alert and oriented x3, moving all 4 extremities   equally.    LABORATORY DATA:  WBC is 17.1, hemoglobin 14.4, platelets 119.  Sodium 133,   potassium 3.6, BUN 20, creatinine 1.15, bicarb is 18.  Magnesium 1.4.    IMAGING DATA:  X-ray of the left knee shows a joint effusion.  This is   actually palpable on examination as well and chest x-ray as reviewed by myself   is clear with no infiltrate, no pneumothorax and patient has no evidence of   effusion.    ASSESSMENT AND PLAN:  1.  Left knee infection complicated with hardware in place.  Patient will be   admitted to the hospital.  Dr. Cat has been consulted on the case and will   see the patient for aspiration of fluid.  Patient will be scheduled for   operating room time tomorrow and will have surgical intervention done at that   time with either washout or possible hardware removal depending on decision of   orthopedic surgery and if his aspirate does show infection I will continue him on   Unasyn and vancomycin for coverage of infection.  2.  Glaucoma.  I will continue his outpatient eye drops medications with   Xalatan and Timoptic solutions.  3.  Hypomagnesemia, I will order magnesium replacement  therapy.  4.  Patient is a full code, will need greater than 2 midnights stay for   treatment of his complicated cellulitis with joint infection.       ____________________________________     MD YEIMY CAMPA / PHIL    DD:  06/11/2017 14:28:42  DT:  06/11/2017 17:20:02    D#:  [id number]  Job#:  [id number]

## 2017-06-12 NOTE — PROGRESS NOTES
[facility]ist Progress Note    Date of Service: 2017    Chief Complaint  67 y.o. male hx of infected left knee w hardware in place admitted 2017 with increased left knee swelling and pain      Interval Problem Update    Post left knee aspiration / I+D - cx group G strep growth.  Low grade fevers overnight.  Improved pain - little stiff w movement.     Consultants/Specialty    Ortho - dr. Cat.    Disposition    Possible snf for long term iv atbs vs outpt set up.        Review of Systems   Constitutional: Positive for fever and chills.   HENT: Negative for congestion.    Respiratory: Negative for cough and shortness of breath.    Cardiovascular: Positive for leg swelling. Negative for chest pain and palpitations.   Gastrointestinal: Negative for vomiting and abdominal pain.   Genitourinary: Negative for hematuria and flank pain.   Musculoskeletal: Positive for myalgias and joint pain.   Neurological: Negative for sensory change, speech change, focal weakness and headaches.      Physical Exam  Laboratory/Imaging   Hemodynamics  Temp (24hrs), Av.8 °C (98.3 °F), Min:35.7 °C (96.2 °F), Max:39.2 °C (102.6 °F)   Temperature: 36.7 °C (98.1 °F)  Pulse  Av.9  Min: 65  Max: 105 Heart Rate (Monitored): 70  Blood Pressure : 107/67 mmHg, NIBP: 107/64 mmHg      Respiratory      Respiration: 17, Pulse Oximetry: 98 %     Work Of Breathing / Effort: Mild;Tachypnea       Fluids    Intake/Output Summary (Last 24 hours) at 17 1134  Last data filed at 17 0845   Gross per 24 hour   Intake   2080 ml   Output   1500 ml   Net    580 ml       Nutrition  Orders Placed This Encounter   Procedures   • DIET ORDER     Standing Status: Standing      Number of Occurrences: 1      Standing Expiration Date:      Order Specific Question:  Diet:     Answer:  Regular [1]     Physical Exam   Constitutional: He is oriented to person, place, and time. No distress.   Severely obese.    HENT:   Head: Atraumatic.   Eyes:  EOM are normal. No scleral icterus.   Neck: Neck supple. No JVD present.   Cardiovascular: Normal rate and regular rhythm.    No murmur heard.  Pulmonary/Chest: No stridor. He has no wheezes. He has no rales.   Abdominal: Soft. Bowel sounds are normal. He exhibits no distension. There is no tenderness.   Musculoskeletal: He exhibits edema and tenderness.   Bilateral surgical knee scars present.  Left edema extending knee to foot region.  Wrapped. Small Wound vac present.   Neurological: He is alert and oriented to person, place, and time. No cranial nerve deficit.   Skin: Skin is warm and dry. He is not diaphoretic. No pallor.   Psychiatric: He has a normal mood and affect. His behavior is normal.       Recent Labs      06/11/17   1220  06/12/17   0145   WBC  17.1*  14.9*   RBC  4.59*  4.16*   HEMOGLOBIN  14.4  12.9*   HEMATOCRIT  42.1  39.4*   MCV  91.7  94.7   MCH  31.4  31.0   MCHC  34.2  32.7*   RDW  47.0  49.4   PLATELETCT  119*  97*   MPV  11.6  11.6     Recent Labs      06/11/17   1220  06/12/17   0145   SODIUM  133*  135   POTASSIUM  3.6  4.8   CHLORIDE  105  108   CO2  18*  21   GLUCOSE  140*  155*   BUN  20  23*   CREATININE  1.15  1.22   CALCIUM  8.5  8.2*     Recent Labs      06/11/17   1220   APTT  27.2   INR  1.19*     Recent Labs      06/11/17   1220   BNPBTYPENAT  67              Assessment/Plan     * Septic joint of left knee joint (CMS-HCC)  Assessment & Plan  Group B strep- post I+D   IV Unasyn, Vanco  Wound vac care   Ortho input    Sepsis (CMS-HCC)  Assessment & Plan  attrib to septic arthritis   Prn tylenol for fever  IV atbs- fu cx id     S/P knee replacement (present on admission)  Assessment & Plan  Had hardware placed many years ago w  hardware removed. Varying courses of IV atbs.    Glaucoma  Assessment & Plan  cw xalantin , timolol       Labs reviewed, Medications reviewed and Radiology images reviewed  Boyce catheter: No Boyce      DVT Prophylaxis: Heparin      Antibiotics: Treating  active infection/contamination beyond 24 hours perioperative coverage

## 2017-06-12 NOTE — CARE PLAN
Problem: Infection  Goal: Will remain free from infection  Outcome: PROGRESSING AS EXPECTED  Synovial fluid cx is + for bacterial growth; Blood cx pending    Problem: Discharge Barriers/Planning  Goal: Patient’s continuum of care needs will be met  Outcome: PROGRESSING AS EXPECTED  Pt receiving IV ABX; PT/OT eval and tx ordered    Problem: Urinary Elimination:  Goal: Ability to reestablish a normal urinary elimination pattern will improve  Outcome: PROGRESSING AS EXPECTED  Boyce DC'd at 0845 per protocol - No indication; Discussed monitoring for void w/i 6 hours per hospital policy

## 2017-06-12 NOTE — CARE PLAN
Problem: Safety  Goal: Will remain free from injury  Intervention: Provide assistance with mobility  Hourly rounding, safety education, fall precautions, pt calls for assist      Problem: Pain Management  Goal: Pain level will decrease to patient’s comfort goal  Intervention: Follow pain managment plan developed in collaboration with patient and Interdisciplinary Team  Pain meds given as needed per orders

## 2017-06-12 NOTE — PROGRESS NOTES
Patient A&Ox/4  Mood pleasant  C/o of left knee pain, cold pack given  OSH weber d/c and reinserted  Wife at bedside  Patient on 6L O2  Last BM PTA per patient

## 2017-06-12 NOTE — PROGRESS NOTES
Received shift report from keena RN and assumed care of this pt at 0715. Pt AOx4, calm, resting in bed. SO at bedside. Pt reports pain is 1/10 - Cold pack in place. Pt is up 1 assist +FWW (NWB LLE pending clarification). Does call appropriately. Bed alarm is off per pt refusal. PIV assessed and is patent, CDI, running NS @ 75ml/hr. PRITESH dressing to L knee, CDI. Pt is on 4L nc with SaO2 >90%,  in use. Pt states priority this shift is determining DC POC. Discussed POC for MD rounds, mobility, medications, day time routine, comfort, and safety. Patient has call light and personal belongings within reach. Safety and fall precautions in place. Reviewed orders, notes, labs, and test results. Hourly rounding in place with RN rounding on odd hours and CNA on even hours.

## 2017-06-13 ENCOUNTER — APPOINTMENT (OUTPATIENT)
Dept: RADIOLOGY | Facility: MEDICAL CENTER | Age: 67
DRG: 466 | End: 2017-06-13
Attending: ORTHOPAEDIC SURGERY
Payer: MEDICARE

## 2017-06-13 ENCOUNTER — APPOINTMENT (OUTPATIENT)
Dept: RADIOLOGY | Facility: MEDICAL CENTER | Age: 67
DRG: 466 | End: 2017-06-13
Attending: INTERNAL MEDICINE
Payer: MEDICARE

## 2017-06-13 LAB
ANION GAP SERPL CALC-SCNC: 6 MMOL/L (ref 0–11.9)
ANISOCYTOSIS BLD QL SMEAR: ABNORMAL
BACTERIA FLD AEROBE CULT: ABNORMAL
BACTERIA FLD AEROBE CULT: ABNORMAL
BACTERIA UR CULT: NORMAL
BASOPHILS # BLD AUTO: 0 % (ref 0–1.8)
BASOPHILS # BLD: 0 K/UL (ref 0–0.12)
BUN SERPL-MCNC: 22 MG/DL (ref 8–22)
CALCIUM SERPL-MCNC: 8.7 MG/DL (ref 8.5–10.5)
CHLORIDE SERPL-SCNC: 108 MMOL/L (ref 96–112)
CO2 SERPL-SCNC: 25 MMOL/L (ref 20–33)
CREAT SERPL-MCNC: 0.87 MG/DL (ref 0.5–1.4)
EOSINOPHIL # BLD AUTO: 0 K/UL (ref 0–0.51)
EOSINOPHIL NFR BLD: 0 % (ref 0–6.9)
ERYTHROCYTE [DISTWIDTH] IN BLOOD BY AUTOMATED COUNT: 51.6 FL (ref 35.9–50)
GFR SERPL CREATININE-BSD FRML MDRD: >60 ML/MIN/1.73 M 2
GLUCOSE SERPL-MCNC: 116 MG/DL (ref 65–99)
GRAM STN SPEC: ABNORMAL
HCT VFR BLD AUTO: 37.9 % (ref 42–52)
HGB BLD-MCNC: 12.2 G/DL (ref 14–18)
LYMPHOCYTES # BLD AUTO: 0.84 K/UL (ref 1–4.8)
LYMPHOCYTES NFR BLD: 6.9 % (ref 22–41)
MACROCYTES BLD QL SMEAR: ABNORMAL
MANUAL DIFF BLD: NORMAL
MCH RBC QN AUTO: 30.9 PG (ref 27–33)
MCHC RBC AUTO-ENTMCNC: 32.2 G/DL (ref 33.7–35.3)
MCV RBC AUTO: 95.9 FL (ref 81.4–97.8)
MICROCYTES BLD QL SMEAR: ABNORMAL
MONOCYTES # BLD AUTO: 0.43 K/UL (ref 0–0.85)
MONOCYTES NFR BLD AUTO: 3.5 % (ref 0–13.4)
MORPHOLOGY BLD-IMP: NORMAL
NEUTROPHILS # BLD AUTO: 10.93 K/UL (ref 1.82–7.42)
NEUTROPHILS NFR BLD: 89.6 % (ref 44–72)
NRBC # BLD AUTO: 0 K/UL
NRBC BLD AUTO-RTO: 0 /100 WBC
PLATELET # BLD AUTO: 108 K/UL (ref 164–446)
PLATELET BLD QL SMEAR: NORMAL
PMV BLD AUTO: 12.3 FL (ref 9–12.9)
POLYCHROMASIA BLD QL SMEAR: NORMAL
POTASSIUM SERPL-SCNC: 4.2 MMOL/L (ref 3.6–5.5)
RBC # BLD AUTO: 3.95 M/UL (ref 4.7–6.1)
RBC BLD AUTO: PRESENT
SIGNIFICANT IND 70042: ABNORMAL
SIGNIFICANT IND 70042: NORMAL
SITE SITE: ABNORMAL
SITE SITE: NORMAL
SODIUM SERPL-SCNC: 139 MMOL/L (ref 135–145)
SOURCE SOURCE: ABNORMAL
SOURCE SOURCE: NORMAL
WBC # BLD AUTO: 12.2 K/UL (ref 4.8–10.8)

## 2017-06-13 PROCEDURE — 36415 COLL VENOUS BLD VENIPUNCTURE: CPT

## 2017-06-13 PROCEDURE — 80048 BASIC METABOLIC PNL TOTAL CA: CPT

## 2017-06-13 PROCEDURE — 700105 HCHG RX REV CODE 258

## 2017-06-13 PROCEDURE — 306589 SLEEVE,VASO CALF LARGE: Performed by: HOSPITALIST

## 2017-06-13 PROCEDURE — C1751 CATH, INF, PER/CENT/MIDLINE: HCPCS

## 2017-06-13 PROCEDURE — G8978 MOBILITY CURRENT STATUS: HCPCS | Mod: CI

## 2017-06-13 PROCEDURE — 306587 SLEEVE,VASO CALF BARIATRIC: Performed by: HOSPITALIST

## 2017-06-13 PROCEDURE — 90670 PCV13 VACCINE IM: CPT | Performed by: INTERNAL MEDICINE

## 2017-06-13 PROCEDURE — 770006 HCHG ROOM/CARE - MED/SURG/GYN SEMI*

## 2017-06-13 PROCEDURE — 700105 HCHG RX REV CODE 258: Performed by: INTERNAL MEDICINE

## 2017-06-13 PROCEDURE — 77002 NEEDLE LOCALIZATION BY XRAY: CPT

## 2017-06-13 PROCEDURE — 97161 PT EVAL LOW COMPLEX 20 MIN: CPT

## 2017-06-13 PROCEDURE — 700111 HCHG RX REV CODE 636 W/ 250 OVERRIDE (IP)

## 2017-06-13 PROCEDURE — G8980 MOBILITY D/C STATUS: HCPCS | Mod: CI

## 2017-06-13 PROCEDURE — 85027 COMPLETE CBC AUTOMATED: CPT

## 2017-06-13 PROCEDURE — 36569 INSJ PICC 5 YR+ W/O IMAGING: CPT

## 2017-06-13 PROCEDURE — 99233 SBSQ HOSP IP/OBS HIGH 50: CPT | Performed by: HOSPITALIST

## 2017-06-13 PROCEDURE — A9270 NON-COVERED ITEM OR SERVICE: HCPCS | Performed by: INTERNAL MEDICINE

## 2017-06-13 PROCEDURE — 700102 HCHG RX REV CODE 250 W/ 637 OVERRIDE(OP): Performed by: INTERNAL MEDICINE

## 2017-06-13 PROCEDURE — 700111 HCHG RX REV CODE 636 W/ 250 OVERRIDE (IP): Performed by: INTERNAL MEDICINE

## 2017-06-13 PROCEDURE — G8979 MOBILITY GOAL STATUS: HCPCS | Mod: CI

## 2017-06-13 PROCEDURE — 700111 HCHG RX REV CODE 636 W/ 250 OVERRIDE (IP): Performed by: ORTHOPAEDIC SURGERY

## 2017-06-13 PROCEDURE — 90471 IMMUNIZATION ADMIN: CPT

## 2017-06-13 PROCEDURE — 700101 HCHG RX REV CODE 250: Performed by: INTERNAL MEDICINE

## 2017-06-13 PROCEDURE — 85007 BL SMEAR W/DIFF WBC COUNT: CPT

## 2017-06-13 PROCEDURE — 3E0234Z INTRODUCTION OF SERUM, TOXOID AND VACCINE INTO MUSCLE, PERCUTANEOUS APPROACH: ICD-10-PCS | Performed by: HOSPITALIST

## 2017-06-13 RX ORDER — SODIUM CHLORIDE 9 MG/ML
INJECTION, SOLUTION INTRAVENOUS
Status: COMPLETED
Start: 2017-06-13 | End: 2017-06-13

## 2017-06-13 RX ORDER — POLYETHYLENE GLYCOL 3350 17 G/17G
1 POWDER, FOR SOLUTION ORAL DAILY
Status: DISCONTINUED | OUTPATIENT
Start: 2017-06-13 | End: 2017-06-20 | Stop reason: HOSPADM

## 2017-06-13 RX ADMIN — ACETAMINOPHEN 650 MG: 325 TABLET, FILM COATED ORAL at 15:32

## 2017-06-13 RX ADMIN — AMPICILLIN SODIUM AND SULBACTAM SODIUM 3 G: 2; 1 INJECTION, POWDER, FOR SOLUTION INTRAMUSCULAR; INTRAVENOUS at 06:30

## 2017-06-13 RX ADMIN — STANDARDIZED SENNA CONCENTRATE AND DOCUSATE SODIUM 2 TABLET: 8.6; 5 TABLET, FILM COATED ORAL at 20:20

## 2017-06-13 RX ADMIN — SODIUM CHLORIDE 1000 ML: 9 INJECTION, SOLUTION INTRAVENOUS at 16:40

## 2017-06-13 RX ADMIN — HEPARIN SODIUM 5000 UNITS: 5000 INJECTION, SOLUTION INTRAVENOUS; SUBCUTANEOUS at 08:18

## 2017-06-13 RX ADMIN — TIMOLOL MALEATE 1 DROP: 2.5 SOLUTION OPHTHALMIC at 08:36

## 2017-06-13 RX ADMIN — AMPICILLIN SODIUM AND SULBACTAM SODIUM 3 G: 2; 1 INJECTION, POWDER, FOR SOLUTION INTRAMUSCULAR; INTRAVENOUS at 00:30

## 2017-06-13 RX ADMIN — SODIUM CHLORIDE 500 ML: 9 INJECTION, SOLUTION INTRAVENOUS at 11:19

## 2017-06-13 RX ADMIN — HEPARIN SODIUM 5000 UNITS: 5000 INJECTION, SOLUTION INTRAVENOUS; SUBCUTANEOUS at 20:20

## 2017-06-13 RX ADMIN — LATANOPROST 1 DROP: 50 SOLUTION OPHTHALMIC at 20:20

## 2017-06-13 RX ADMIN — PNEUMOCOCCAL 13-VALENT CONJUGATE VACCINE 0.5 ML: 2.2; 2.2; 2.2; 2.2; 2.2; 4.4; 2.2; 2.2; 2.2; 2.2; 2.2; 2.2; 2.2 INJECTION, SUSPENSION INTRAMUSCULAR at 08:23

## 2017-06-13 RX ADMIN — ACETAMINOPHEN 650 MG: 325 TABLET, FILM COATED ORAL at 08:18

## 2017-06-13 RX ADMIN — DOXYCYCLINE 200 MG: 100 INJECTION, POWDER, LYOPHILIZED, FOR SOLUTION INTRAVENOUS at 16:00

## 2017-06-13 RX ADMIN — POLYETHYLENE GLYCOL 3350 1 PACKET: 17 POWDER, FOR SOLUTION ORAL at 13:57

## 2017-06-13 RX ADMIN — AMPICILLIN SODIUM AND SULBACTAM SODIUM 3 G: 2; 1 INJECTION, POWDER, FOR SOLUTION INTRAMUSCULAR; INTRAVENOUS at 11:19

## 2017-06-13 RX ADMIN — VANCOMYCIN HYDROCHLORIDE 1500 MG: 100 INJECTION, POWDER, LYOPHILIZED, FOR SOLUTION INTRAVENOUS at 08:22

## 2017-06-13 RX ADMIN — STANDARDIZED SENNA CONCENTRATE AND DOCUSATE SODIUM 2 TABLET: 8.6; 5 TABLET, FILM COATED ORAL at 08:18

## 2017-06-13 RX ADMIN — CEFTRIAXONE SODIUM 2 G: 2 INJECTION, POWDER, FOR SOLUTION INTRAMUSCULAR; INTRAVENOUS at 15:32

## 2017-06-13 RX ADMIN — TIMOLOL MALEATE 1 DROP: 2.5 SOLUTION OPHTHALMIC at 20:20

## 2017-06-13 ASSESSMENT — ENCOUNTER SYMPTOMS
SPEECH CHANGE: 0
HEMOPTYSIS: 0
DIZZINESS: 0
FLANK PAIN: 0
FOCAL WEAKNESS: 0
EYE DISCHARGE: 0
PALPITATIONS: 0
SENSORY CHANGE: 0
COUGH: 0
SHORTNESS OF BREATH: 0
FEVER: 0
VOMITING: 0
PSYCHIATRIC NEGATIVE: 1
BLOOD IN STOOL: 0
CHILLS: 0
ABDOMINAL PAIN: 0
MYALGIAS: 0
EYE REDNESS: 0
ORTHOPNEA: 0

## 2017-06-13 ASSESSMENT — COGNITIVE AND FUNCTIONAL STATUS - GENERAL
WALKING IN HOSPITAL ROOM: A LITTLE
CLIMB 3 TO 5 STEPS WITH RAILING: A LITTLE
SUGGESTED CMS G CODE MODIFIER MOBILITY: CJ
MOBILITY SCORE: 22

## 2017-06-13 ASSESSMENT — GAIT ASSESSMENTS
ASSISTIVE DEVICE: FRONT WHEEL WALKER
DEVIATION: STEP TO
GAIT LEVEL OF ASSIST: SUPERVISED
DISTANCE (FEET): 200

## 2017-06-13 ASSESSMENT — PAIN SCALES - GENERAL
PAINLEVEL_OUTOF10: 2
PAINLEVEL_OUTOF10: 0

## 2017-06-13 NOTE — CONSULTS
DATE OF SERVICE:  06/13/2017    HISTORY OF PRESENT ILLNESS:  This is a 67-year-old man who is postoperative   drainage, debridement of a left prosthetic knee.  Patient has a complicated   medical past related to left knee.  _____ several years ago had a delayed   reimplantation from a prior infected implant.  Operative cultures at this time   are growing a beta streptococcal organism.  He currently denies nausea,   vomiting, fever, chills, or diarrhea.  Patient otherwise does well and has no   chronic illnesses to include diabetes, hypertension, renal disease.    Consultation is requested at this time for opinion regarding patient's current   clinical status as well as any further diagnostic therapeutic   recommendations.      PAST MEDICAL HISTORY:  ALLERGIES:  None known to antibiotics.      REVIEW OF SYSTEMS:  Negative other than above.      SOCIAL HISTORY:  Patient has no ethanol, tobacco abuse, patient is ,   with healthy children.      PHYSICAL EXAMINATION:    VITAL SIGNS:  Temperature 98, pulse 87, respirations 18 and BP _____.    GENERAL:  A 67-year-old man who is well developed, well nourished, lying   quietly in bed.  Patient is alert and oriented.    SKIN:  No obvious rashes.    HEENT:  Negative.    NECK:  Supple.    LYMPHATIC:  No adenopathy.    LUNGS:  Good air entry bilaterally, expiration not prolonged.    HEART:  Regular rhythm, no murmurs noted.    ABDOMEN:  Deferred.    EXTREMITIES:  No peripheral edema, calf tenderness, clubbing, petechiae;   operative pack in place over the left knee.  No new erythema.  Incision   intact.      RECENT LABORATORY:  Potassium 4.2, creatinine 0.87.  WBC 12,200.      IMPRESSION:  Stable postoperative debridement and drainage of an infected left   prosthetic knee -- a streptococcal organism is defined pathogen to date; no   systemic toxicity from sepsis.      RECOMMENDATIONS:    1.  Transition patient to an outpatient intravenous antibiotic program:    Rocephin 2  g IV q. 24 hours for approximately 35 days followed by longterm   oral suppressive antibiotic -- this to be determined later.    2.  Percutaneous central line placement.    3.  We will discuss further with you.       ____________________________________     JOSE TOLEDO MD RLS / PHIL    DD:  06/13/2017 11:36:51  DT:  06/13/2017 16:17:52    D#:  [id number]  Job#:  [id number]

## 2017-06-13 NOTE — PROGRESS NOTES
[facility]    Date of Service: 2017    Chief Complaint  67 y.o. male hx of infected left knee w hardware in place admitted 2017 with increased left knee swelling and pain      Interval Problem Update    Pain is controlled, complains of constipation    Consultants/Specialty    Ortho - dr. Cat.            Review of Systems   Constitutional: Negative for fever, chills and malaise/fatigue.   HENT: Negative.    Eyes: Negative for discharge and redness.   Respiratory: Negative for cough, hemoptysis and shortness of breath.    Cardiovascular: Positive for leg swelling. Negative for chest pain, palpitations and orthopnea.   Gastrointestinal: Negative for vomiting, abdominal pain, blood in stool and melena.   Genitourinary: Negative for hematuria and flank pain.   Musculoskeletal: Positive for joint pain. Negative for myalgias.   Skin: Negative.    Neurological: Negative for dizziness, sensory change, speech change and focal weakness.   Psychiatric/Behavioral: Negative.       Physical Exam  Laboratory/Imaging   Hemodynamics  Temp (24hrs), Av.1 °C (98.7 °F), Min:36.5 °C (97.7 °F), Max:37.7 °C (99.9 °F)   Temperature: 36.8 °C (98.2 °F)  Pulse  Av.5  Min: 65  Max: 105    Blood Pressure : 128/78 mmHg      Respiratory      Respiration: 18, Pulse Oximetry: 94 %     Work Of Breathing / Effort: Mild;Tachypnea       Fluids    Intake/Output Summary (Last 24 hours) at 17 0839  Last data filed at 17   Gross per 24 hour   Intake   2790 ml   Output   1400 ml   Net   1390 ml       Nutrition  Orders Placed This Encounter   Procedures   • DIET ORDER     Standing Status: Standing      Number of Occurrences: 1      Standing Expiration Date:      Order Specific Question:  Diet:     Answer:  Regular [1]     Physical Exam   Constitutional: He is oriented to person, place, and time. No distress.   HENT:   Head: Normocephalic and atraumatic.   Right Ear: External ear normal.   Left Ear:  External ear normal.   Eyes: Conjunctivae and EOM are normal. Right eye exhibits no discharge. Left eye exhibits no discharge. No scleral icterus.   Neck: Neck supple. No JVD present. No tracheal deviation present.   Cardiovascular: Normal rate and regular rhythm.  Exam reveals no friction rub.    No murmur heard.  Pulmonary/Chest: No stridor. He has no wheezes. He has no rales.   Abdominal: Soft. Bowel sounds are normal. He exhibits no distension. There is no tenderness.   Musculoskeletal: He exhibits edema and tenderness.   Neurological: He is alert and oriented to person, place, and time. No cranial nerve deficit.   Skin: Skin is warm and dry. He is not diaphoretic. No cyanosis or erythema. Nails show no clubbing.   Left knee compressive dressing in place   Psychiatric: He has a normal mood and affect. His behavior is normal.   Vitals reviewed.      Recent Labs      06/11/17   1220  06/12/17   0145  06/13/17   0129   WBC  17.1*  14.9*  12.2*   RBC  4.59*  4.16*  3.95*   HEMOGLOBIN  14.4  12.9*  12.2*   HEMATOCRIT  42.1  39.4*  37.9*   MCV  91.7  94.7  95.9   MCH  31.4  31.0  30.9   MCHC  34.2  32.7*  32.2*   RDW  47.0  49.4  51.6*   PLATELETCT  119*  97*  108*   MPV  11.6  11.6  12.3     Recent Labs      06/11/17   1220  06/12/17   0145  06/13/17   0129   SODIUM  133*  135  139   POTASSIUM  3.6  4.8  4.2   CHLORIDE  105  108  108   CO2  18*  21  25   GLUCOSE  140*  155*  116*   BUN  20  23*  22   CREATININE  1.15  1.22  0.87   CALCIUM  8.5  8.2*  8.7     Recent Labs      06/11/17   1220   APTT  27.2   INR  1.19*     Recent Labs      06/11/17   1220   BNPBTYPENAT  67              Assessment/Plan     * Septic joint of left knee joint (CMS-HCC)  Assessment & Plan  S/p irrigation and debridement   Cx beta strep group G  Continue unasyn, d/c vanc  ID consult discussed with Dr Andrews    Sepsis (CMS-HCC)  Assessment & Plan  attrib to septic arthritis   resolved    Thrombocytopenia (CMS-HCC) (present on  admission)  Assessment & Plan  Mild monitor    S/P knee replacement (present on admission)  Assessment & Plan  Previously   hardware removed.  .    Glaucoma  Assessment & Plan   xalantan , timolol       Labs reviewed, Medications reviewed and Radiology images reviewed  Boyce catheter: No Boyce      DVT Prophylaxis: Heparin      Antibiotics: Treating active infection/contamination beyond 24 hours perioperative coverage

## 2017-06-13 NOTE — PROGRESS NOTES
Patient aox4. VSS. Rested well throughout shift. Pain meds given. Call light within reach. No falls during shift. Plan of care ongoing.

## 2017-06-13 NOTE — PROGRESS NOTES
Received shift report from keena RN and assumed care of this pt at 0715. Pt AOx4, calm, sitting in chair for breakfast. Pt reports pain is 2/10 to L knee - Cold pack applied. Pt is up SBA +FWW, WBAT BLE. Does call appropriately. Bed alarm is off per pt refusal. PIV assessed and is patent, CDI, running NS @ 75ml/hr. Pt is on RA with SaO2 >90%. Pt states priority this shift is mobility. Discussed POC for PICC placement for LTIVABX (consent signed and in chart), day time routine, comfort, and safety. Patient has call light and personal belongings within reach. Safety and fall precautions in place. Reviewed orders, notes, labs, and test results. Hourly rounding in place with RN rounding on odd hours and CNA on even hours.

## 2017-06-13 NOTE — CARE PLAN
Problem: Bowel/Gastric:  Goal: Normal bowel function is maintained or improved  Outcome: PROGRESSING SLOWER THAN EXPECTED  Pt c/o difficulty having BM; Bowel protocol started per MAR    Problem: Knowledge Deficit  Goal: Knowledge of disease process/condition, treatment plan, diagnostic tests, and medications will improve  Outcome: PROGRESSING AS EXPECTED  Pt educated regarding plan of care and medications. All questions answered.

## 2017-06-13 NOTE — PROGRESS NOTES
PICC Insertion Procedure Note    PICC needed for antibiotics. Consent confirmed, vessel patency confirmed with ultrasound. Risks and benefits of procedure explained to patient and education regarding central line associated bloodstream infections provided. Questions answered.     PICC placed in RUE per MD order with ultrasound guidance. 4 Fr, single lumen PICC placed in basilic vein after 1 attempt(s). 3 cc's of 1% lidocaine injected intradermally, 21 gauge microintroducer needle and modified Seldinger technique used. 42 cm total catheter length, 1 cm external measurement inserted with good blood return. Each lumen flushed without resistance with 10 mL 0.9% normal saline. PICC line secured with Statlock,  Biopatch, and Tegaderm.    PICC tip location in the SVC confirmed by chest xray. Pt tolerated procedure quite well.  Patient condition relayed to unit RN or ordering physician via this post procedure note in the EMR.     [employer] Power PICC ref # [id number], Lot # [id number]

## 2017-06-13 NOTE — THERAPY
"Physical Therapy Evaluation completed.   Bed Mobility:  Supine to Sit: Supervised  Transfers: Sit to Stand: Supervised  Gait: Level Of Assist: Supervised with Front-Wheel Walker       Plan of Care: Patient with no further skilled PT needs in the acute care setting at this time  Discharge Recommendations: Equipment: No Equipment Needed. Post-acute therapy Discharge to home with outpatient PT for additional skilled therapy services.    See \"Rehab Therapy-Acute\" Patient Summary Report for complete documentation.     "

## 2017-06-13 NOTE — DOCUMENTATION QUERY
DOCUMENTATION QUERY    PROVIDERS: Please select “Cosign w/ note”to reply to query.    To better represent the severity of illness of your patient, please review the following information and exercise your independent professional judgment in responding to this query.      Sepsis attributed to septic arthritis is documented in the progress notes dated 6/12/17 - 6/13/17      Sepsis is documented in the ED provider note dated 6/11/17     Sepsis is not documented in the H&P dated 6/11/17     Based upon the clinical findings, risk factors, and treatment, please specify if sepsis was present on admission or  developed after admission    • Sepsis  was present on admission.  • Sepsis  developed after admission  • Unable to determine        The medical record reflects the following:   Clinical Findings Septic joint L knee   Presence of artificial knee joint  Admit vitals: /74, HR 96, T 98.0, RR 18  Admit labs: WBC 17.1, lactic acid 1.7   Treatment Antibiotics  IVF  Drainage of left knee joint   Infectious disease consult  Orthopedic consult   Risk Factors Age  Infection and inflammatory reaction due to joint prosthesis   Obesity    Location within medical record History and Physical, Progress Notes and Lab Results      Thank you,   Polina Torrez RN  Clinical   [phone]

## 2017-06-14 ENCOUNTER — APPOINTMENT (OUTPATIENT)
Dept: RADIOLOGY | Facility: MEDICAL CENTER | Age: 67
DRG: 466 | End: 2017-06-14
Attending: HOSPITALIST
Payer: MEDICARE

## 2017-06-14 LAB
ANION GAP SERPL CALC-SCNC: 4 MMOL/L (ref 0–11.9)
BACTERIA WND AEROBE CULT: ABNORMAL
BACTERIA WND AEROBE CULT: ABNORMAL
BASOPHILS # BLD AUTO: 0.3 % (ref 0–1.8)
BASOPHILS # BLD: 0.02 K/UL (ref 0–0.12)
BUN SERPL-MCNC: 11 MG/DL (ref 8–22)
CALCIUM SERPL-MCNC: 7.5 MG/DL (ref 8.5–10.5)
CHLORIDE SERPL-SCNC: 110 MMOL/L (ref 96–112)
CO2 SERPL-SCNC: 22 MMOL/L (ref 20–33)
CREAT SERPL-MCNC: 0.68 MG/DL (ref 0.5–1.4)
EOSINOPHIL # BLD AUTO: 0.03 K/UL (ref 0–0.51)
EOSINOPHIL NFR BLD: 0.4 % (ref 0–6.9)
ERYTHROCYTE [DISTWIDTH] IN BLOOD BY AUTOMATED COUNT: 50.4 FL (ref 35.9–50)
GFR SERPL CREATININE-BSD FRML MDRD: >60 ML/MIN/1.73 M 2
GLUCOSE SERPL-MCNC: 99 MG/DL (ref 65–99)
GRAM STN SPEC: ABNORMAL
HCT VFR BLD AUTO: 31.1 % (ref 42–52)
HGB BLD-MCNC: 10.1 G/DL (ref 14–18)
IMM GRANULOCYTES # BLD AUTO: 0.07 K/UL (ref 0–0.11)
IMM GRANULOCYTES NFR BLD AUTO: 0.9 % (ref 0–0.9)
LYMPHOCYTES # BLD AUTO: 0.87 K/UL (ref 1–4.8)
LYMPHOCYTES NFR BLD: 10.9 % (ref 22–41)
MCH RBC QN AUTO: 30.9 PG (ref 27–33)
MCHC RBC AUTO-ENTMCNC: 32.5 G/DL (ref 33.7–35.3)
MCV RBC AUTO: 95.1 FL (ref 81.4–97.8)
MONOCYTES # BLD AUTO: 0.87 K/UL (ref 0–0.85)
MONOCYTES NFR BLD AUTO: 10.9 % (ref 0–13.4)
NEUTROPHILS # BLD AUTO: 6.09 K/UL (ref 1.82–7.42)
NEUTROPHILS NFR BLD: 76.6 % (ref 44–72)
NRBC # BLD AUTO: 0 K/UL
NRBC BLD AUTO-RTO: 0 /100 WBC
PLATELET # BLD AUTO: 109 K/UL (ref 164–446)
PMV BLD AUTO: 12.3 FL (ref 9–12.9)
POTASSIUM SERPL-SCNC: 3.6 MMOL/L (ref 3.6–5.5)
RBC # BLD AUTO: 3.27 M/UL (ref 4.7–6.1)
SIGNIFICANT IND 70042: ABNORMAL
SITE SITE: ABNORMAL
SODIUM SERPL-SCNC: 136 MMOL/L (ref 135–145)
SOURCE SOURCE: ABNORMAL
WBC # BLD AUTO: 8 K/UL (ref 4.8–10.8)

## 2017-06-14 PROCEDURE — 770001 HCHG ROOM/CARE - MED/SURG/GYN PRIV*

## 2017-06-14 PROCEDURE — 71010 DX-CHEST-LIMITED (1 VIEW): CPT

## 2017-06-14 PROCEDURE — 700105 HCHG RX REV CODE 258: Performed by: INTERNAL MEDICINE

## 2017-06-14 PROCEDURE — 700111 HCHG RX REV CODE 636 W/ 250 OVERRIDE (IP): Performed by: INTERNAL MEDICINE

## 2017-06-14 PROCEDURE — 99233 SBSQ HOSP IP/OBS HIGH 50: CPT | Performed by: HOSPITALIST

## 2017-06-14 PROCEDURE — A9270 NON-COVERED ITEM OR SERVICE: HCPCS | Performed by: INTERNAL MEDICINE

## 2017-06-14 PROCEDURE — 85025 COMPLETE CBC W/AUTO DIFF WBC: CPT

## 2017-06-14 PROCEDURE — 700111 HCHG RX REV CODE 636 W/ 250 OVERRIDE (IP): Performed by: ORTHOPAEDIC SURGERY

## 2017-06-14 PROCEDURE — 700102 HCHG RX REV CODE 250 W/ 637 OVERRIDE(OP): Performed by: INTERNAL MEDICINE

## 2017-06-14 PROCEDURE — 80048 BASIC METABOLIC PNL TOTAL CA: CPT

## 2017-06-14 RX ADMIN — CEFTRIAXONE SODIUM 2 G: 2 INJECTION, POWDER, FOR SOLUTION INTRAMUSCULAR; INTRAVENOUS at 09:13

## 2017-06-14 RX ADMIN — LATANOPROST 1 DROP: 50 SOLUTION OPHTHALMIC at 22:04

## 2017-06-14 RX ADMIN — TIMOLOL MALEATE 1 DROP: 2.5 SOLUTION OPHTHALMIC at 22:04

## 2017-06-14 RX ADMIN — HEPARIN SODIUM 5000 UNITS: 5000 INJECTION, SOLUTION INTRAVENOUS; SUBCUTANEOUS at 21:50

## 2017-06-14 RX ADMIN — HEPARIN SODIUM 5000 UNITS: 5000 INJECTION, SOLUTION INTRAVENOUS; SUBCUTANEOUS at 09:12

## 2017-06-14 RX ADMIN — TIMOLOL MALEATE 1 DROP: 2.5 SOLUTION OPHTHALMIC at 09:15

## 2017-06-14 ASSESSMENT — ENCOUNTER SYMPTOMS
ABDOMINAL PAIN: 0
MYALGIAS: 0
SENSORY CHANGE: 0
CHILLS: 0
FEVER: 0
BLOOD IN STOOL: 0
HEMOPTYSIS: 0
VOMITING: 0
COUGH: 0
ORTHOPNEA: 0
SPEECH CHANGE: 0
PSYCHIATRIC NEGATIVE: 1
FOCAL WEAKNESS: 0
SHORTNESS OF BREATH: 1
PALPITATIONS: 0
DIZZINESS: 0
FLANK PAIN: 0
EYES NEGATIVE: 1
WHEEZING: 0

## 2017-06-14 ASSESSMENT — PAIN SCALES - GENERAL
PAINLEVEL_OUTOF10: 0
PAINLEVEL_OUTOF10: 0

## 2017-06-14 NOTE — CARE PLAN
"Problem: Safety  Goal: Will remain free from falls  Outcome: PROGRESSING AS EXPECTED  Call light within reach. Pt calls for assistance at all times.  Bed in lowest position, upper bedrails up, personal possessions within reach, treaded socks on.  Hourly rounding in place.            Problem: Bowel/Gastric:  Goal: Normal bowel function is maintained or improved  Outcome: PROGRESSING AS EXPECTED  Pt. Stated he had \"large BM\" this morning. Positive for flautus, active bowel sounds        "

## 2017-06-14 NOTE — PROGRESS NOTES
DATE OF SERVICE:  06/13/2017    I saw the patient on my afternoon rounds.  Overall, he is doing quite well.    He reports that his pain is markedly improved from before the operation.    Since the operation, his cultures have come back positive for moderate   gram-positive cocci and beta streptococcus group G with heavy growth.  I have   since discussed this with Dr. Lewis who is involved, infectious disease.  The   patient is grossly neurologically intact distally and wound VAC is clean, dry,   and intact.    ASSESSMENT AND PLAN:  From my perspective, patient can now be cared for by Dr. Lewis.  This is really just a social visit to say hi and see how the patient   is doing.  Should he have any questions or would like to contact me, he is   certainly more than welcome.  Otherwise, I think he can probably just follow   up with Dr. Lewis.       ____________________________________     MD SILVANO Lawrence / PHIL    DD:  06/13/2017 20:32:54  DT:  06/13/2017 20:59:44    D#:  [id number]  Job#:  [id number]

## 2017-06-14 NOTE — PROGRESS NOTES
Apparently patient was also seen by Dr. Sandeep Hernandez from infectious diseases. I will sign off the case and the care to Dr. Hernandez. Discussed with internal medicine.

## 2017-06-14 NOTE — PROGRESS NOTES
PRITESH dressing reviewed and is saturated/ vac is functioning appropriately, dressing reinforced, will notify Dr. Cat/Anup CRONIN for orders.

## 2017-06-14 NOTE — CONSULTS
DATE OF SERVICE:  06/13/2017    REFERRING PHYSICIAN:  Dakota Guzman MD    REASON FOR CONSULTATION:  Prosthetic knee infection.    HISTORY OF PRESENT ILLNESS:  The patient is a 67-year-old white male who is   known to us from his previous admissions.  He was seen by us back in March 2014.  He had a right knee replacement in 2004, then left knee since 2010.  In   March 2014, he developed fevers and swelling of his left knee.  During that   time, he had explantation of the knee.  He has a 2-step knee replacement done.    His cultures were group G strep.  He had a prolonged treatment as well as   2-step procedure.  After that, he did have admission in January 2015 for   cellulitis of the left leg.  He was given 4 weeks of antibiotics.  He was in   his usual state of health.  While golfing, he developed left leg swelling and   pain, as well as left knee pain.  Hence, he went to [facility].  From   there, he was transferred to [facility].  Since he has been here, he underwent   left total knee revision of patellar component and I and D on 06/12/2017.  His   blood cultures from 06/11/2017 are negative.  His synovial fluid, which was   drained, showed infection.  Those cultures are showing group G strep as well   as his OR cultures are showing group G strep. In view of that, infectious   disease consult has been called.    REVIEW OF SYSTEMS:  The patient states he had some nausea, pain in the knee,   had fevers up to 102.6, had some chills.  No vomiting.  No cough.  No   shortness of breath.  Other review of systems is negative per AMA and CMS   criteria.    ALLERGIES:  No known drug allergies.    PAST MEDICAL HISTORY:  Glaucoma, obesity, and as above.    PAST SURGICAL HISTORY:  Right femur ORIF, bilateral knee arthroplasty.    FAMILY HISTORY:  Reviewed and noncontributory.    SOCIAL HISTORY:  Does not smoke, 3-4 alcoholic drinks per week.  .    MEDICATIONS:  He is on Rocephin, doxycycline, Tylenol, Vasotec,  Sindi Green Dulcolax.    LABORATORY DATA:  His WBC count is 12.2, platelets of 108.  Creatinine is   0.87.    PHYSICAL EXAMINATION:  GENERAL:  The patient looks tired.  VITAL SIGNS:  T-max is 100.5, blood pressure is 128/71, pulse is 86.  HEAD AND ENT:  Mucosa is moist.  No oral thrush.  NECK:  Supple.  No lymphadenopathy.  PULMONARY:  Bilateral air entry.  Clear to auscultation.  CARDIOVASCULAR:  Regular.  No murmurs or gallops heard.  ABDOMEN:  Obese, soft, nontender.  EXTREMITIES:  Left leg is warmer to touch than the right.  PRITESH dressing in   place on the left knee.  CENTRAL NERVOUS SYSTEM:  Alert and oriented x3.  No focal neurological   deficit.    ASSESSMENT:  1.  Infected left prosthetic knee, recurrent.  2.  Obesity.    RECOMMENDATIONS:  At this time, I would recommend to discontinue the   doxycycline.  Continue with the Rocephin.  I will discuss with ortho whether   this knee needs to be removed.  Whether the patient undergoes just I and D or   again a 2-step procedure, he needs lifelong suppressive antibiotics, which   would be penicillin- mg p.o. b.i.d. in this case after completing   treatment for this particular infection.  I have reviewed all the records.    Plan was discussed with internal medicine.    Thank you for the consultation.       ____________________________________     FARHEEN MORENO MD JD / PHIL    DD:  06/13/2017 16:04:32  DT:  06/13/2017 18:34:55    D#:  [id number]  Job#:  [id number]

## 2017-06-14 NOTE — PROGRESS NOTES
Bedside report received from Randy MARIEE with RN Nico Banuelos present, pt sitting in bedside chair with no c/o at this time, room air, IV s/l, COLEEN picc KVO, dressing to left knee with old discharge noted, fall precautions in place, pt calls for assist.

## 2017-06-14 NOTE — PROGRESS NOTES
Patient aox4. Ambulated to bathroom. Meds given. Call light within reach. VSS. PICC clean, dry, intact. (Chl-bath given). Pain improving. Plan of care ongoing.

## 2017-06-14 NOTE — PROGRESS NOTES
"Pt. Observed sitting in chair for meals with noticeable head \"wobble\" noted. When asked pt. States, has had for sometime\". Will discuss with family and physician.  1000 per review with spouse pt. Has had symptoms for some time. Per spouse also radiates into right arm and hand. None of which has been reviewed by physician.       "

## 2017-06-14 NOTE — PROGRESS NOTES
Gerson dressing changed per discussion with Anup CRONIN and orders, pt tolerated well.  Patient having mild SOB on room air saturation level of 88-92% with no c/o chest pain.  Due to pt h/o of DVT and PE, discussed with Dr. Audar Guzman who reviewed with pt and will be placing orders.

## 2017-06-14 NOTE — PROGRESS NOTES
[facility] Hospitalist Progress Note    Date of Service: 2017    Chief Complaint  67 y.o. male hx of infected left knee w hardware in place admitted 2017 with increased left knee swelling and pain      Interval Problem Update    Pain is controlled, mild CARRILLO    Consultants/Specialty    Ortho  .  ID            Review of Systems   Constitutional: Negative for fever, chills and malaise/fatigue.   HENT: Negative.    Eyes: Negative.    Respiratory: Positive for shortness of breath. Negative for cough, hemoptysis and wheezing.    Cardiovascular: Positive for leg swelling. Negative for chest pain, palpitations and orthopnea.   Gastrointestinal: Negative for vomiting, abdominal pain, blood in stool and melena.   Genitourinary: Negative for dysuria, hematuria and flank pain.   Musculoskeletal: Positive for joint pain. Negative for myalgias.   Skin: Negative.    Neurological: Negative for dizziness, sensory change, speech change and focal weakness.   Psychiatric/Behavioral: Negative.       Physical Exam  Laboratory/Imaging   Hemodynamics  Temp (24hrs), Av.3 °C (99.1 °F), Min:36.9 °C (98.5 °F), Max:37.6 °C (99.6 °F)   Temperature: 36.9 °C (98.5 °F)  Pulse  Av.3  Min: 65  Max: 105    Blood Pressure : 153/84 mmHg (Simultaneous filing. User may not have seen previous data.)      Respiratory      Respiration: 18, Pulse Oximetry: 94 %     Work Of Breathing / Effort: Mild       Fluids    Intake/Output Summary (Last 24 hours) at 17 1639  Last data filed at 17 0700   Gross per 24 hour   Intake      0 ml   Output   1375 ml   Net  -1375 ml       Nutrition  Orders Placed This Encounter   Procedures   • DIET ORDER     Standing Status: Standing      Number of Occurrences: 1      Standing Expiration Date:      Order Specific Question:  Diet:     Answer:  Regular [1]     Physical Exam   Constitutional: He is oriented to person, place, and time. He appears well-developed. No distress.   HENT:   Head: Normocephalic  and atraumatic.   Right Ear: External ear normal.   Left Ear: External ear normal.   Eyes: Conjunctivae and EOM are normal. Right eye exhibits no discharge. Left eye exhibits no discharge. No scleral icterus.   Neck: Neck supple. No JVD present. No tracheal deviation present.   Cardiovascular: Normal rate and regular rhythm.  Exam reveals no friction rub.    No murmur heard.  Pulmonary/Chest: He has decreased breath sounds. He has no wheezes. He has no rhonchi. He has rales. He exhibits no tenderness.   Abdominal: Soft. Bowel sounds are normal. He exhibits no distension. There is no tenderness. There is no rebound.   Musculoskeletal: He exhibits edema and tenderness.   Neurological: He is alert and oriented to person, place, and time. No cranial nerve deficit.   Skin: Skin is warm and dry. He is not diaphoretic. No cyanosis or erythema. Nails show no clubbing.   Wound vac in place   Psychiatric: He has a normal mood and affect. His behavior is normal.   Vitals reviewed.      Recent Labs      06/12/17 0145 06/13/17 0129 06/14/17   0630   WBC  14.9*  12.2*  8.0   RBC  4.16*  3.95*  3.27*   HEMOGLOBIN  12.9*  12.2*  10.1*   HEMATOCRIT  39.4*  37.9*  31.1*   MCV  94.7  95.9  95.1   MCH  31.0  30.9  30.9   MCHC  32.7*  32.2*  32.5*   RDW  49.4  51.6*  50.4*   PLATELETCT  97*  108*  109*   MPV  11.6  12.3  12.3     Recent Labs      06/12/17   0145  06/13/17   0129 06/14/17   0630   SODIUM  135  139  136   POTASSIUM  4.8  4.2  3.6   CHLORIDE  108  108  110   CO2  21  25  22   GLUCOSE  155*  116*  99   BUN  23*  22  11   CREATININE  1.22  0.87  0.68   CALCIUM  8.2*  8.7  7.5*                      Assessment/Plan     * Septic joint of left knee joint (CMS-[facility])  Assessment & Plan  S/p irrigation and debridement   Cx beta strep group G  On ceftriaxone, ID following    Sepsis (CMS-HCC)  Assessment & Plan  attrib to septic arthritis   resolved    Thrombocytopenia (CMS-[facility]) (present on admission)  Assessment & Plan  Mild  monitor    Constipation  Assessment & Plan  Bowel protocol    Dyspnea on exertion  Assessment & Plan  Query sec to atelectasis, encourage IS use check CXR further workup if not improved    S/P knee replacement (present on admission)  Assessment & Plan  Previously   hardware removed.  .    Glaucoma  Assessment & Plan   xalantan , timolol       Plan of care reviewed with patient and discussed with nursing staff    Labs reviewed, Medications reviewed and Radiology images reviewed  Boyce catheter: No Boyce      DVT Prophylaxis: Heparin      Antibiotics: Treating active infection/contamination beyond 24 hours perioperative coverage

## 2017-06-15 LAB
ANION GAP SERPL CALC-SCNC: 6 MMOL/L (ref 0–11.9)
APPEARANCE UR: CLEAR
BASOPHILS # BLD AUTO: 0.4 % (ref 0–1.8)
BASOPHILS # BLD: 0.04 K/UL (ref 0–0.12)
BILIRUB UR QL STRIP.AUTO: NEGATIVE
BUN SERPL-MCNC: 13 MG/DL (ref 8–22)
CALCIUM SERPL-MCNC: 8.7 MG/DL (ref 8.5–10.5)
CHLORIDE SERPL-SCNC: 102 MMOL/L (ref 96–112)
CO2 SERPL-SCNC: 25 MMOL/L (ref 20–33)
COLOR UR: YELLOW
CREAT SERPL-MCNC: 0.71 MG/DL (ref 0.5–1.4)
EOSINOPHIL # BLD AUTO: 0.06 K/UL (ref 0–0.51)
EOSINOPHIL NFR BLD: 0.6 % (ref 0–6.9)
ERYTHROCYTE [DISTWIDTH] IN BLOOD BY AUTOMATED COUNT: 50 FL (ref 35.9–50)
GFR SERPL CREATININE-BSD FRML MDRD: >60 ML/MIN/1.73 M 2
GLUCOSE SERPL-MCNC: 113 MG/DL (ref 65–99)
GLUCOSE UR STRIP.AUTO-MCNC: NEGATIVE MG/DL
HCT VFR BLD AUTO: 34.6 % (ref 42–52)
HGB BLD-MCNC: 11.5 G/DL (ref 14–18)
IMM GRANULOCYTES # BLD AUTO: 0.18 K/UL (ref 0–0.11)
IMM GRANULOCYTES NFR BLD AUTO: 1.9 % (ref 0–0.9)
KETONES UR STRIP.AUTO-MCNC: NEGATIVE MG/DL
LEUKOCYTE ESTERASE UR QL STRIP.AUTO: NEGATIVE
LYMPHOCYTES # BLD AUTO: 1.24 K/UL (ref 1–4.8)
LYMPHOCYTES NFR BLD: 13 % (ref 22–41)
MCH RBC QN AUTO: 30.7 PG (ref 27–33)
MCHC RBC AUTO-ENTMCNC: 33.2 G/DL (ref 33.7–35.3)
MCV RBC AUTO: 92.5 FL (ref 81.4–97.8)
MICRO URNS: ABNORMAL
MONOCYTES # BLD AUTO: 1.05 K/UL (ref 0–0.85)
MONOCYTES NFR BLD AUTO: 11 % (ref 0–13.4)
MUCOUS THREADS #/AREA URNS HPF: ABNORMAL /HPF
NEUTROPHILS # BLD AUTO: 6.99 K/UL (ref 1.82–7.42)
NEUTROPHILS NFR BLD: 73.1 % (ref 44–72)
NITRITE UR QL STRIP.AUTO: NEGATIVE
NRBC # BLD AUTO: 0 K/UL
NRBC BLD AUTO-RTO: 0 /100 WBC
PH UR STRIP.AUTO: 6 [PH]
PLATELET # BLD AUTO: 159 K/UL (ref 164–446)
PMV BLD AUTO: 11.8 FL (ref 9–12.9)
POTASSIUM SERPL-SCNC: 3.9 MMOL/L (ref 3.6–5.5)
PROT UR QL STRIP: 30 MG/DL
RBC # BLD AUTO: 3.74 M/UL (ref 4.7–6.1)
RBC # URNS HPF: ABNORMAL /HPF
RBC UR QL AUTO: ABNORMAL
SODIUM SERPL-SCNC: 133 MMOL/L (ref 135–145)
SP GR UR STRIP.AUTO: 1.02
WBC # BLD AUTO: 9.6 K/UL (ref 4.8–10.8)
WBC #/AREA URNS HPF: ABNORMAL /HPF

## 2017-06-15 PROCEDURE — 700111 HCHG RX REV CODE 636 W/ 250 OVERRIDE (IP): Performed by: ORTHOPAEDIC SURGERY

## 2017-06-15 PROCEDURE — 81001 URINALYSIS AUTO W/SCOPE: CPT

## 2017-06-15 PROCEDURE — 700105 HCHG RX REV CODE 258: Performed by: INTERNAL MEDICINE

## 2017-06-15 PROCEDURE — 700111 HCHG RX REV CODE 636 W/ 250 OVERRIDE (IP): Performed by: INTERNAL MEDICINE

## 2017-06-15 PROCEDURE — 51798 US URINE CAPACITY MEASURE: CPT

## 2017-06-15 PROCEDURE — 85025 COMPLETE CBC W/AUTO DIFF WBC: CPT

## 2017-06-15 PROCEDURE — 99232 SBSQ HOSP IP/OBS MODERATE 35: CPT | Performed by: HOSPITALIST

## 2017-06-15 PROCEDURE — 80048 BASIC METABOLIC PNL TOTAL CA: CPT

## 2017-06-15 PROCEDURE — 770001 HCHG ROOM/CARE - MED/SURG/GYN PRIV*

## 2017-06-15 RX ADMIN — LATANOPROST 1 DROP: 50 SOLUTION OPHTHALMIC at 20:46

## 2017-06-15 RX ADMIN — TIMOLOL MALEATE 1 DROP: 2.5 SOLUTION OPHTHALMIC at 20:46

## 2017-06-15 RX ADMIN — HEPARIN SODIUM 5000 UNITS: 5000 INJECTION, SOLUTION INTRAVENOUS; SUBCUTANEOUS at 09:42

## 2017-06-15 RX ADMIN — HEPARIN SODIUM 5000 UNITS: 5000 INJECTION, SOLUTION INTRAVENOUS; SUBCUTANEOUS at 20:45

## 2017-06-15 RX ADMIN — TIMOLOL MALEATE 1 DROP: 2.5 SOLUTION OPHTHALMIC at 09:43

## 2017-06-15 RX ADMIN — CEFTRIAXONE SODIUM 2 G: 2 INJECTION, POWDER, FOR SOLUTION INTRAMUSCULAR; INTRAVENOUS at 09:42

## 2017-06-15 ASSESSMENT — ENCOUNTER SYMPTOMS
VOMITING: 0
BLOOD IN STOOL: 0
FEVER: 0
SPEECH CHANGE: 0
ABDOMINAL PAIN: 0
WHEEZING: 0
MYALGIAS: 0
FOCAL WEAKNESS: 0
LOSS OF CONSCIOUSNESS: 0
SHORTNESS OF BREATH: 0
PSYCHIATRIC NEGATIVE: 1
SENSORY CHANGE: 0
CHILLS: 0
HEMOPTYSIS: 0
PALPITATIONS: 0
COUGH: 0
FLANK PAIN: 0
EYES NEGATIVE: 1

## 2017-06-15 ASSESSMENT — PAIN SCALES - GENERAL
PAINLEVEL_OUTOF10: 0
PAINLEVEL_OUTOF10: 0

## 2017-06-15 NOTE — PROGRESS NOTES
Pt a & o x 4. Ambulates with 1 assistance. C/o pain 0 /10.   regular diet.  IV intact, picc intact . Skin assessment dressing left leg small drainage, aki intact.     Family and patient verbalize concern about discharge today. Feel he is still weak, had fevers (two days ago) and breathing problems (yesterday). Live 2.5 hours away- do not know what to do about infusions. Will notify doctor memo figueroa    Call light and belongings within reach.   Assistive ambulation devices out of reach of pt.   All questions answered.  Will continue to assess.

## 2017-06-15 NOTE — CARE PLAN
Problem: Venous Thromboembolism (VTW)/Deep Vein Thrombosis (DVT) Prevention:  Goal: Patient will participate in Venous Thrombosis (VTE)/Deep Vein Thrombosis (DVT)Prevention Measures  Outcome: PROGRESSING AS EXPECTED  Patient has hx of DVT and PEs in 2014, SCDs on, patient denies pain in calves, no abnormal swelling in lower extremities compared to baseline. Patient currently SOB which appears MD is aware in notes, lung sounds clear/diminished. CXR is negative, no suspicion of PE at this time.     Problem: Respiratory:  Goal: Respiratory status will improve  Outcome: PROGRESSING AS EXPECTED  Patient performed IS correctly, reached up to 2250 ml, lung sounds clear/diminished. CXR shows infiltrate/atelectasis on lower base. Encouraging frequent IS use, patient verbalize understanding. During midnight vitals patient desat to 88%, placed patient back on 2 liters, oxygen saturation 93%, increased oxygen to 3 liters, oxygen saturation now 95%. Called RT Mayur about increase in oxygen, RT explained 88% while sleeping is expected, patient not in respiratory distress, however, observed SOB which appears to be baseline according to physician's note. Placed patient on continuous pulse ox, weaned patient down to 0.5 liters per RT's recommendation.

## 2017-06-15 NOTE — PROGRESS NOTES
Orthopaedic Progress Note    Interval changes:  Cleared for DC home   Will need outpatient infusion services set up- challenge since patient is not from [facility]  Case coordination working on setting up services    ROS - Patient denies any new issues.  Pain well controlled.    Blood pressure 139/73, pulse 78, temperature 36.5 °C (97.7 °F), resp. rate 17, weight 117.935 kg (260 lb), SpO2 94 %.      Patient seen and examined  No acute distress  Breathing non labored  RRR  LLE PRITESH dressing with min sanguinous exudate at inferior pole.  There is no erythema, induration, or signs of continued infection.  Patient clearly fires tibialis anterior, EHL, and gastrocnemius/soleus. Sensation is intact to light touch throughout superficial peroneal, deep peroneal, tibial, saphenous, and sural nerve distributions. Strong and palpable 2+ dorsalis pedis and posterior tibial pulses with capillary refill less than 2 seconds. No lower leg tenderness or discomfort.        Recent Labs      06/13/17   0129  06/14/17   0630  06/15/17   0254   WBC  12.2*  8.0  9.6   RBC  3.95*  3.27*  3.74*   HEMOGLOBIN  12.2*  10.1*  11.5*   HEMATOCRIT  37.9*  31.1*  34.6*   MCV  95.9  95.1  92.5   MCH  30.9  30.9  30.7   MCHC  32.2*  32.5*  33.2*   RDW  51.6*  50.4*  50.0   PLATELETCT  108*  109*  159*   MPV  12.3  12.3  11.8       Active Hospital Problems    Diagnosis   • Sepsis (CMS-[facility]) [A41.9]     Priority: High   • Septic joint of left knee joint (CMS-[facility]) [M00.9]     Priority: High   • Thrombocytopenia (CMS-[facility]) [D69.6]     Priority: Medium   • Glaucoma [H40.9]     Priority: Low   • S/P knee replacement [Z96.659]     Priority: Low   • Dyspnea on exertion [R06.09]   • Constipation [K59.00]     Add miralax         Assessment/Plan:  Patient doing well  Pending set up of outpatient services  POD#4 S/P Left total knee revision of patellar component, irrigation and debridement.  Wt bearing status - WBAT  Wound care/Drains - PRITESH dressing to stay in  place until follow up  Future Procedures - none planned   Sutures/Staples out- 14-21 days post operatively  PT/OT-initiated  Antibiotics: rocephin 2g IV QD  DVT Prophylaxis- TEDS/SCDs/Foot pumps  Boyce-none  Case Coordination for Discharge Planning - Disposition pending Abx needs.

## 2017-06-16 LAB
BACTERIA BLD CULT: NORMAL
BACTERIA BLD CULT: NORMAL
BACTERIA SPEC ANAEROBE CULT: NORMAL
SIGNIFICANT IND 70042: NORMAL
SITE SITE: NORMAL
SOURCE SOURCE: NORMAL

## 2017-06-16 PROCEDURE — 97530 THERAPEUTIC ACTIVITIES: CPT

## 2017-06-16 PROCEDURE — 700105 HCHG RX REV CODE 258: Performed by: INTERNAL MEDICINE

## 2017-06-16 PROCEDURE — 700102 HCHG RX REV CODE 250 W/ 637 OVERRIDE(OP): Performed by: INTERNAL MEDICINE

## 2017-06-16 PROCEDURE — A9270 NON-COVERED ITEM OR SERVICE: HCPCS | Performed by: INTERNAL MEDICINE

## 2017-06-16 PROCEDURE — 700111 HCHG RX REV CODE 636 W/ 250 OVERRIDE (IP): Performed by: INTERNAL MEDICINE

## 2017-06-16 PROCEDURE — 99232 SBSQ HOSP IP/OBS MODERATE 35: CPT | Performed by: HOSPITALIST

## 2017-06-16 PROCEDURE — 770001 HCHG ROOM/CARE - MED/SURG/GYN PRIV*

## 2017-06-16 PROCEDURE — 97535 SELF CARE MNGMENT TRAINING: CPT

## 2017-06-16 PROCEDURE — 700111 HCHG RX REV CODE 636 W/ 250 OVERRIDE (IP): Performed by: ORTHOPAEDIC SURGERY

## 2017-06-16 RX ADMIN — ACETAMINOPHEN 650 MG: 325 TABLET, FILM COATED ORAL at 19:45

## 2017-06-16 RX ADMIN — ACETAMINOPHEN 650 MG: 325 TABLET, FILM COATED ORAL at 03:39

## 2017-06-16 RX ADMIN — TIMOLOL MALEATE 1 DROP: 2.5 SOLUTION OPHTHALMIC at 19:46

## 2017-06-16 RX ADMIN — TIMOLOL MALEATE 1 DROP: 2.5 SOLUTION OPHTHALMIC at 08:19

## 2017-06-16 RX ADMIN — CEFTRIAXONE SODIUM 2 G: 2 INJECTION, POWDER, FOR SOLUTION INTRAMUSCULAR; INTRAVENOUS at 08:20

## 2017-06-16 RX ADMIN — HEPARIN SODIUM 5000 UNITS: 5000 INJECTION, SOLUTION INTRAVENOUS; SUBCUTANEOUS at 19:45

## 2017-06-16 RX ADMIN — HEPARIN SODIUM 5000 UNITS: 5000 INJECTION, SOLUTION INTRAVENOUS; SUBCUTANEOUS at 08:19

## 2017-06-16 RX ADMIN — LATANOPROST 1 DROP: 50 SOLUTION OPHTHALMIC at 19:46

## 2017-06-16 ASSESSMENT — ENCOUNTER SYMPTOMS
PALPITATIONS: 0
CHILLS: 0
ABDOMINAL PAIN: 0
ORTHOPNEA: 0
COUGH: 0
FOCAL WEAKNESS: 0
MYALGIAS: 0
PSYCHIATRIC NEGATIVE: 1
VOMITING: 0
SHORTNESS OF BREATH: 1
FLANK PAIN: 0
WHEEZING: 0
SENSORY CHANGE: 0
FEVER: 0
LOSS OF CONSCIOUSNESS: 0
SPEECH CHANGE: 0
EYES NEGATIVE: 1
DIAPHORESIS: 0
HEMOPTYSIS: 0
BLOOD IN STOOL: 0

## 2017-06-16 ASSESSMENT — COGNITIVE AND FUNCTIONAL STATUS - GENERAL
SUGGESTED CMS G CODE MODIFIER DAILY ACTIVITY: CK
PERSONAL GROOMING: A LITTLE
DAILY ACTIVITIY SCORE: 19
HELP NEEDED FOR BATHING: A LITTLE
TOILETING: A LITTLE
DRESSING REGULAR LOWER BODY CLOTHING: A LITTLE
DRESSING REGULAR UPPER BODY CLOTHING: A LITTLE

## 2017-06-16 ASSESSMENT — PAIN SCALES - GENERAL
PAINLEVEL_OUTOF10: 0

## 2017-06-16 NOTE — PROGRESS NOTES
Pt a&o. VSS. Denies pain to LLE. Tylenol given for headache. Pt voiding in urinal. Call bell in reach. Hourly rounding. Slept comfortably

## 2017-06-16 NOTE — PROGRESS NOTES
Pt a & o x 4. Ambulates with standby assistance. C/o pain 0 /10.   regular diet.  IV picc patent . Skin assessment dressing more saturated today  No c/o sob. Some dysuria  Call light and belongings within reach.   Assistive ambulation devices out of reach of pt.   All questions answered.  Will continue to assess.

## 2017-06-16 NOTE — PROGRESS NOTES
[facility] Hospitalist Progress Note    Date of Service: 6/15/2017    Chief Complaint  67 y.o. male hx of infected left knee w hardware in place admitted 2017 with increased left knee swelling and pain      Interval Problem Update    Pain is controlled, dyspnea resolved weaned off oxygen, complains of mild dysuria    Consultants/Specialty    Ortho  .  ID            Review of Systems   Constitutional: Negative for fever and chills.   HENT: Negative.    Eyes: Negative.    Respiratory: Negative for cough, hemoptysis, shortness of breath and wheezing.    Cardiovascular: Positive for leg swelling. Negative for chest pain and palpitations.   Gastrointestinal: Negative for vomiting, abdominal pain, blood in stool and melena.   Genitourinary: Positive for dysuria. Negative for hematuria and flank pain.   Musculoskeletal: Positive for joint pain. Negative for myalgias.   Skin: Negative.    Neurological: Negative for sensory change, speech change, focal weakness and loss of consciousness.   Psychiatric/Behavioral: Negative.       Physical Exam  Laboratory/Imaging   Hemodynamics  Temp (24hrs), Av.7 °C (98 °F), Min:36.4 °C (97.5 °F), Max:37.1 °C (98.7 °F)   Temperature: 36.7 °C (98 °F)  Pulse  Av.8  Min: 65  Max: 105    Blood Pressure : 138/72 mmHg      Respiratory      Respiration: 17, Pulse Oximetry: 94 %     Work Of Breathing / Effort: Moderate       Fluids    Intake/Output Summary (Last 24 hours) at 06/15/17 1711  Last data filed at 06/15/17 1600   Gross per 24 hour   Intake    738 ml   Output    300 ml   Net    438 ml       Nutrition  Orders Placed This Encounter   Procedures   • DIET ORDER     Standing Status: Standing      Number of Occurrences: 1      Standing Expiration Date:      Order Specific Question:  Diet:     Answer:  Regular [1]     Physical Exam   Constitutional: He is oriented to person, place, and time. He appears well-developed. No distress.   HENT:   Head: Normocephalic and atraumatic.   Right  Ear: External ear normal.   Left Ear: External ear normal.   Eyes: Right eye exhibits no discharge. Left eye exhibits no discharge. No scleral icterus.   Neck: Neck supple. No JVD present. No tracheal deviation present.   Cardiovascular: Normal rate and regular rhythm.  Exam reveals no friction rub.    No murmur heard.  Pulmonary/Chest: No stridor. He has decreased breath sounds. He has no wheezes. He has no rhonchi. He has no rales. He exhibits no tenderness.   Abdominal: Soft. Bowel sounds are normal. He exhibits no distension. There is no tenderness. There is no rebound.   Musculoskeletal: He exhibits edema and tenderness.   Neurological: He is alert and oriented to person, place, and time. No cranial nerve deficit.   Skin: Skin is warm and dry. He is not diaphoretic. No cyanosis or erythema. Nails show no clubbing.   Wound vac in place   Psychiatric: He has a normal mood and affect. His behavior is normal.   Vitals reviewed.      Recent Labs      06/13/17 0129 06/14/17   0630  06/15/17   0254   WBC  12.2*  8.0  9.6   RBC  3.95*  3.27*  3.74*   HEMOGLOBIN  12.2*  10.1*  11.5*   HEMATOCRIT  37.9*  31.1*  34.6*   MCV  95.9  95.1  92.5   MCH  30.9  30.9  30.7   MCHC  32.2*  32.5*  33.2*   RDW  51.6*  50.4*  50.0   PLATELETCT  108*  109*  159*   MPV  12.3  12.3  11.8     Recent Labs      06/13/17   0129 06/14/17   0630  06/15/17   0254   SODIUM  139  136  133*   POTASSIUM  4.2  3.6  3.9   CHLORIDE  108  110  102   CO2  25  22  25   GLUCOSE  116*  99  113*   BUN  22  11  13   CREATININE  0.87  0.68  0.71   CALCIUM  8.7  7.5*  8.7                      Assessment/Plan     * Septic joint of left knee joint (CMS-[facility])  Assessment & Plan  S/p irrigation and debridement   Cx beta strep group G  On ceftriaxone, ID following  Discussed with MSW regarding options for outpt IV abx    Sepsis (CMS-[facility])  Assessment & Plan  attrib to septic arthritis   resolved    Thrombocytopenia (CMS-[facility]) (present on admission)  Assessment &  Plan  improved    Constipation  Assessment & Plan  Bowel protocol    Dyspnea on exertion  Assessment & Plan  Improved CXR atelectasis  Continue IS and clinical monitoring    Dysuria  Assessment & Plan  Check u/a    S/P knee replacement (present on admission)  Assessment & Plan  Previously   hardware removed.  .    Glaucoma  Assessment & Plan   xalantan , timolol       Plan of care reviewed with patient and discussed with nursing staff    Labs reviewed, Medications reviewed and Radiology images reviewed  Boyce catheter: No Boyce      DVT Prophylaxis: Heparin      Antibiotics: Treating active infection/contamination beyond 24 hours perioperative coverage

## 2017-06-16 NOTE — CARE PLAN
Problem: Infection  Goal: Will remain free from infection  Intervention: Assess signs and symptoms of infection  Patient educated on s/s of infection. Verbalized understanding.

## 2017-06-16 NOTE — THERAPY
"Occupational Therapy Treatment completed with focus on ADLs, ADL transfers and patient education.  Functional Status:  Pt was seen for Occupational Therapy treatment today, see Therapy Kardex for details.    Treatment included education in breath control with activity and at rest, self pacing techs and energy conservation for pain management. Educated pt in safety awareness techs as well. WBAT LLE. Small wound vac in place in knee. Educated pt in seated shower techs with DME/AE needs; dressing LB with use of AE; Min A for toilet transfers, SBA for full toilet hygiene post large BM. Pt stood at sink for oral hygiene and grooming, shaving with a safety razor demo increased dynamic standing balance requiring occasional CGA due to fatigue. Pt complains of a \"Right rotator cuff strain\" which limits AROM in shoulder. Pt also demonstrated  CGA for Ambulating ADL's with FWW. Pt gives good effort and is motivated in therapy. Pt was assisted BTB, call light in reach and nursing is aware. Continue Occupational Therapy services as per plan.    Plan of Care: Will benefit from Occupational Therapy 2 times per week  Discharge Recommendations:  Equipment Front-Wheel Walker, Shower Chair and Will Continue to Assess for Equipment Needs. Post-acute therapy Discharge to a transitional care facility for continued skilled therapy services.    See \"Rehab Therapy-Acute\" Patient Summary Report for complete documentation.   "

## 2017-06-16 NOTE — CARE PLAN
Problem: Safety  Goal: Will remain free from injury  Intervention: Provide assistance with mobility  Call bell within reach, hourly rounding.

## 2017-06-16 NOTE — DISCHARGE PLANNING
CCS spoke to Eladia at [facility]. She stated that the patient does not have a pharmacy benefit, so patient would be a private pay. LM for KELLY Canas.

## 2017-06-17 PROCEDURE — A9270 NON-COVERED ITEM OR SERVICE: HCPCS | Performed by: INTERNAL MEDICINE

## 2017-06-17 PROCEDURE — 700111 HCHG RX REV CODE 636 W/ 250 OVERRIDE (IP): Performed by: INTERNAL MEDICINE

## 2017-06-17 PROCEDURE — 700102 HCHG RX REV CODE 250 W/ 637 OVERRIDE(OP): Performed by: INTERNAL MEDICINE

## 2017-06-17 PROCEDURE — 700105 HCHG RX REV CODE 258

## 2017-06-17 PROCEDURE — 700111 HCHG RX REV CODE 636 W/ 250 OVERRIDE (IP): Performed by: ORTHOPAEDIC SURGERY

## 2017-06-17 PROCEDURE — 99232 SBSQ HOSP IP/OBS MODERATE 35: CPT | Performed by: HOSPITALIST

## 2017-06-17 PROCEDURE — 700105 HCHG RX REV CODE 258: Performed by: INTERNAL MEDICINE

## 2017-06-17 PROCEDURE — 770001 HCHG ROOM/CARE - MED/SURG/GYN PRIV*

## 2017-06-17 RX ORDER — SODIUM CHLORIDE 9 MG/ML
INJECTION, SOLUTION INTRAVENOUS
Status: COMPLETED
Start: 2017-06-17 | End: 2017-06-17

## 2017-06-17 RX ADMIN — LATANOPROST 1 DROP: 50 SOLUTION OPHTHALMIC at 19:43

## 2017-06-17 RX ADMIN — HEPARIN SODIUM 5000 UNITS: 5000 INJECTION, SOLUTION INTRAVENOUS; SUBCUTANEOUS at 07:57

## 2017-06-17 RX ADMIN — CEFTRIAXONE SODIUM 2 G: 2 INJECTION, POWDER, FOR SOLUTION INTRAMUSCULAR; INTRAVENOUS at 07:57

## 2017-06-17 RX ADMIN — TIMOLOL MALEATE 1 DROP: 2.5 SOLUTION OPHTHALMIC at 19:43

## 2017-06-17 RX ADMIN — ACETAMINOPHEN 650 MG: 325 TABLET, FILM COATED ORAL at 04:22

## 2017-06-17 RX ADMIN — SODIUM CHLORIDE 500 ML: 9 INJECTION, SOLUTION INTRAVENOUS at 10:50

## 2017-06-17 RX ADMIN — HEPARIN SODIUM 5000 UNITS: 5000 INJECTION, SOLUTION INTRAVENOUS; SUBCUTANEOUS at 19:43

## 2017-06-17 RX ADMIN — TIMOLOL MALEATE 1 DROP: 2.5 SOLUTION OPHTHALMIC at 07:58

## 2017-06-17 ASSESSMENT — ENCOUNTER SYMPTOMS
EYES NEGATIVE: 1
CHILLS: 0
ABDOMINAL PAIN: 0
SPEECH CHANGE: 0
SENSORY CHANGE: 0
COUGH: 0
PSYCHIATRIC NEGATIVE: 1
WHEEZING: 0
LOSS OF CONSCIOUSNESS: 0
SHORTNESS OF BREATH: 0
PALPITATIONS: 0
FLANK PAIN: 0
MYALGIAS: 0
VOMITING: 0
CONSTIPATION: 0
FOCAL WEAKNESS: 0
HEMOPTYSIS: 0
FEVER: 0
BLOOD IN STOOL: 0

## 2017-06-17 ASSESSMENT — PAIN SCALES - GENERAL
PAINLEVEL_OUTOF10: 0
PAINLEVEL_OUTOF10: 0
PAINLEVEL_OUTOF10: 2
PAINLEVEL_OUTOF10: 0

## 2017-06-17 NOTE — PROGRESS NOTES
Assumed care of pt @1900. Bedside report received. Pt AOX 4. Pt denies pain. Temp 100.5 F. Tylenol given. Dressing aki to LLE intact. PICC patent with positive blood return. SCD's in place. Pt up with 1 person assist. Fall precaution in place. POC discussed with pt, all questions answered at this time. Pt makes needs known, call light within reach, hourly rounding in place.

## 2017-06-17 NOTE — PROGRESS NOTES
[facility] Hospitalist Progress Note    Date of Service: 2017    Chief Complaint  67 y.o. male hx of infected left knee w hardware in place admitted 2017 with increased left knee swelling and pain      Interval Problem Update    Pain is controlled,u/a neg dysuria improved    Consultants/Specialty    Ortho  .  ID            Review of Systems   Constitutional: Negative for fever, chills and diaphoresis.   HENT: Negative.    Eyes: Negative.    Respiratory: Positive for shortness of breath (resolved). Negative for cough, hemoptysis and wheezing.    Cardiovascular: Positive for leg swelling. Negative for chest pain, palpitations and orthopnea.   Gastrointestinal: Negative for vomiting, abdominal pain, blood in stool and melena.   Genitourinary: Positive for dysuria (improved). Negative for hematuria and flank pain.   Musculoskeletal: Positive for joint pain. Negative for myalgias.   Skin: Negative.    Neurological: Negative for sensory change, speech change, focal weakness and loss of consciousness.   Psychiatric/Behavioral: Negative.       Physical Exam  Laboratory/Imaging   Hemodynamics  Temp (24hrs), Av.1 °C (98.8 °F), Min:36.4 °C (97.6 °F), Max:37.6 °C (99.7 °F)   Temperature: 36.4 °C (97.6 °F)  Pulse  Av.1  Min: 65  Max: 105    Blood Pressure : 120/75 mmHg      Respiratory      Respiration: 17, Pulse Oximetry: 94 %     Work Of Breathing / Effort:  (SOB with movement )       Fluids    Intake/Output Summary (Last 24 hours) at 17 1705  Last data filed at 17 0400   Gross per 24 hour   Intake      0 ml   Output    950 ml   Net   -950 ml       Nutrition  Orders Placed This Encounter   Procedures   • DIET ORDER     Standing Status: Standing      Number of Occurrences: 1      Standing Expiration Date:      Order Specific Question:  Diet:     Answer:  Regular [1]     Physical Exam   Constitutional: He is oriented to person, place, and time. He appears well-developed. No distress.   HENT:   Head:  Normocephalic and atraumatic.   Mouth/Throat: No oropharyngeal exudate.   Eyes: Conjunctivae are normal. Right eye exhibits no discharge. Left eye exhibits no discharge. No scleral icterus.   Neck: Neck supple. No JVD present. No tracheal deviation present.   Cardiovascular: Normal rate and regular rhythm.  Exam reveals no friction rub.    No murmur heard.  Pulmonary/Chest: He has decreased breath sounds. He has no wheezes. He has no rhonchi. He exhibits no tenderness.   Abdominal: Soft. Bowel sounds are normal. He exhibits no distension. There is no tenderness. There is no rebound and no guarding.   Musculoskeletal: He exhibits edema and tenderness.   Neurological: He is alert and oriented to person, place, and time. No cranial nerve deficit.   Skin: Skin is warm and dry. He is not diaphoretic. No cyanosis or erythema. Nails show no clubbing.   Left knee Wound vac in place   Psychiatric: He has a normal mood and affect. His behavior is normal. Thought content normal.   Vitals reviewed.      Recent Labs      06/14/17   0630  06/15/17   0254   WBC  8.0  9.6   RBC  3.27*  3.74*   HEMOGLOBIN  10.1*  11.5*   HEMATOCRIT  31.1*  34.6*   MCV  95.1  92.5   MCH  30.9  30.7   MCHC  32.5*  33.2*   RDW  50.4*  50.0   PLATELETCT  109*  159*   MPV  12.3  11.8     Recent Labs      06/14/17   0630  06/15/17   0254   SODIUM  136  133*   POTASSIUM  3.6  3.9   CHLORIDE  110  102   CO2  22  25   GLUCOSE  99  113*   BUN  11  13   CREATININE  0.68  0.71   CALCIUM  7.5*  8.7                      Assessment/Plan     * Septic joint of left knee joint (CMS-[facility])  Assessment & Plan  S/p irrigation and debridement   Cx beta strep group G  On ceftriaxone, ID following  Discussed with MSW regarding options for outpt IV abx referrals made await approval    Sepsis (CMS-[facility])  Assessment & Plan  attrib to septic arthritis   resolved    Thrombocytopenia (CMS-[facility]) (present on admission)  Assessment & Plan  improved    Constipation  Assessment &  Plan  Bowel protocol    Dyspnea on exertion  Assessment & Plan  Resolved   CXR atelectasis  Continue IS and clinical monitoring    Dysuria  Assessment & Plan  Improved u/a negative     S/P knee replacement (present on admission)  Assessment & Plan  Previously   hardware removed.  .    Glaucoma  Assessment & Plan   xalantan , timolol       Plan of care reviewed with patient and discussed with nursing staff    Labs reviewed, Medications reviewed and Radiology images reviewed  Boyce catheter: No Boyce      DVT Prophylaxis: Heparin      Antibiotics: Treating active infection/contamination beyond 24 hours perioperative coverage

## 2017-06-17 NOTE — PROGRESS NOTES
DATE OF SERVICE:  06/16/2017    SUBJECTIVE:  Patient offers no new complaints.  Pain in his left knee is   markedly improved.  He denies nausea, vomiting, fever, chills, or diarrhea.    PHYSICAL EXAMINATION:  VITAL SIGNS:  Temperature 97, pulse 87, RR 17.  GENERAL:  Patient is awake, alert, no dyspnea.  LUNGS:  Clear.  EXTREMITIES:  Left knee incision intact.  No new erythema or drainage.    LABORATORY DATA:  Streptococcus group G isolated from the implant debridement.    WBC 9600, hemoglobin 11/34%.    IMPRESSION:  Stable postoperative debridement, drainage of an infected left   prosthetic knee - streptococcal insult defined.    RECOMMENDATIONS:  Continue ceftriaxone 2 g IV q.24 hours.  This to be   continued until approximately 7/25/2017.  Arrangements are being made to   either have a home intravenous antibiotic program or transfer to an extended   care facility.       ____________________________________     JOSE TOLEDO MD RLS / NTS    DD:  06/16/2017 11:30:53  DT:  06/16/2017 19:43:51    D#:  [id number]  Job#:  [id number]

## 2017-06-17 NOTE — DISCHARGE PLANNING
SW informed RN that pt would have to private pay for infusion. Cannot get quote today since [facility] is [facility].

## 2017-06-17 NOTE — CARE PLAN
Problem: Venous Thromboembolism (VTW)/Deep Vein Thrombosis (DVT) Prevention:  Goal: Patient will participate in Venous Thrombosis (VTE)/Deep Vein Thrombosis (DVT)Prevention Measures  Outcome: PROGRESSING AS EXPECTED  SCD's in place. Heparin given.     Problem: Pain Management  Goal: Pain level will decrease to patient’s comfort goal  Outcome: PROGRESSING AS EXPECTED  Pt denies pain.

## 2017-06-17 NOTE — PROGRESS NOTES
Pt a & o x 4. Ambulates with standby assistance. C/o pain 0 /10.   reagular diet.  IV patent picc . Skin assessment aki in place    Paged on call social work at 1100 for information regarding discharge. Awaiting response.    Call light and belongings within reach.   Assistive ambulation devices out of reach of pt.   All questions answered.  Will continue to assess.

## 2017-06-17 NOTE — PROGRESS NOTES
Orthopaedic Progress Note    Interval changes:  Patient cleared for DC   Awaiting home infusion to be set up    ROS - Patient denies any new issues.  Pain well controlled.    Blood pressure 124/71, pulse 84, temperature 36.3 °C (97.4 °F), resp. rate 18, weight 117.935 kg (260 lb), SpO2 94 %.      Patient seen and examined  No acute distress  Breathing non labored  RRR  LLE PRITESH dressing with min sanguinous exudate at inferior pole.  There is no erythema, induration, or signs of continued infection.  Patient clearly fires tibialis anterior, EHL, and gastrocnemius/soleus. Sensation is intact to light touch throughout superficial peroneal, deep peroneal, tibial, saphenous, and sural nerve distributions. Strong and palpable 2+ dorsalis pedis and posterior tibial pulses with capillary refill less than 2 seconds. No lower leg tenderness or discomfort.        Recent Labs      06/15/17   0254   WBC  9.6   RBC  3.74*   HEMOGLOBIN  11.5*   HEMATOCRIT  34.6*   MCV  92.5   MCH  30.7   MCHC  33.2*   RDW  50.0   PLATELETCT  159*   MPV  11.8       Active Hospital Problems    Diagnosis   • Sepsis (CMS-[facility]) [A41.9]     Priority: High   • Septic joint of left knee joint (CMS-[facility]) [M00.9]     Priority: High   • Thrombocytopenia (CMS-[facility]) [D69.6]     Priority: Medium   • Glaucoma [H40.9]     Priority: Low   • S/P knee replacement [Z96.659]     Priority: Low   • Dysuria [R30.0]   • Dyspnea on exertion [R06.09]   • Constipation [K59.00]     Add miralax         Assessment/Plan:  DC pending set up of outpatient services  POD#6 S/P Left total knee revision of patellar component, irrigation and debridement.  Wt bearing status - WBAT  Wound care/Drains - PRITESH dressing to stay in place until follow up  Future Procedures - none planned   Sutures/Staples out- 14-21 days post operatively  PT/OT-initiated  Antibiotics: rocephin 2g IV QD  DVT Prophylaxis- TEDS/SCDs/Foot pumps  Boyce-none  Case Coordination for Discharge Planning - Disposition  pending Abx needs.

## 2017-06-18 PROCEDURE — 700111 HCHG RX REV CODE 636 W/ 250 OVERRIDE (IP): Performed by: ORTHOPAEDIC SURGERY

## 2017-06-18 PROCEDURE — 770001 HCHG ROOM/CARE - MED/SURG/GYN PRIV*

## 2017-06-18 PROCEDURE — 700102 HCHG RX REV CODE 250 W/ 637 OVERRIDE(OP): Performed by: HOSPITALIST

## 2017-06-18 PROCEDURE — 700105 HCHG RX REV CODE 258: Performed by: INTERNAL MEDICINE

## 2017-06-18 PROCEDURE — A9270 NON-COVERED ITEM OR SERVICE: HCPCS | Performed by: HOSPITALIST

## 2017-06-18 PROCEDURE — 99232 SBSQ HOSP IP/OBS MODERATE 35: CPT | Performed by: HOSPITALIST

## 2017-06-18 PROCEDURE — 700111 HCHG RX REV CODE 636 W/ 250 OVERRIDE (IP): Performed by: INTERNAL MEDICINE

## 2017-06-18 RX ORDER — L. ACIDOPHILUS/L.BULGARICUS 100MM CELL
1 GRANULES IN PACKET (EA) ORAL
Status: DISCONTINUED | OUTPATIENT
Start: 2017-06-18 | End: 2017-06-20

## 2017-06-18 RX ADMIN — LACTOBACILLUS ACIDOPHILUS / LACTOBACILLUS BULGARICUS 1 PACKET: 100 MILLION CFU STRENGTH GRANULES at 18:33

## 2017-06-18 RX ADMIN — TIMOLOL MALEATE 1 DROP: 2.5 SOLUTION OPHTHALMIC at 09:24

## 2017-06-18 RX ADMIN — CEFTRIAXONE SODIUM 2 G: 2 INJECTION, POWDER, FOR SOLUTION INTRAMUSCULAR; INTRAVENOUS at 09:15

## 2017-06-18 RX ADMIN — LATANOPROST 1 DROP: 50 SOLUTION OPHTHALMIC at 21:18

## 2017-06-18 RX ADMIN — TIMOLOL MALEATE 1 DROP: 2.5 SOLUTION OPHTHALMIC at 21:18

## 2017-06-18 RX ADMIN — HEPARIN SODIUM 5000 UNITS: 5000 INJECTION, SOLUTION INTRAVENOUS; SUBCUTANEOUS at 21:17

## 2017-06-18 RX ADMIN — HEPARIN SODIUM 5000 UNITS: 5000 INJECTION, SOLUTION INTRAVENOUS; SUBCUTANEOUS at 09:19

## 2017-06-18 ASSESSMENT — ENCOUNTER SYMPTOMS
FEVER: 0
HEMOPTYSIS: 0
FLANK PAIN: 0
EYES NEGATIVE: 1
NAUSEA: 0
CHILLS: 0
SENSORY CHANGE: 0
PALPITATIONS: 0
DIARRHEA: 0
SHORTNESS OF BREATH: 0
SPEECH CHANGE: 0
BLOOD IN STOOL: 0
COUGH: 0
VOMITING: 0
ABDOMINAL PAIN: 0
MYALGIAS: 0
DIZZINESS: 0
DIAPHORESIS: 0
LOSS OF CONSCIOUSNESS: 0
PSYCHIATRIC NEGATIVE: 1
FOCAL WEAKNESS: 0

## 2017-06-18 ASSESSMENT — PAIN SCALES - GENERAL
PAINLEVEL_OUTOF10: 0
PAINLEVEL_OUTOF10: 0

## 2017-06-18 NOTE — PROGRESS NOTES
Orthopaedic Progress Note    Interval changes:  No interval change  Patient cleared for DC pending home infusion     ROS - Patient denies any new issues.  Pain well controlled.    Blood pressure 110/78, pulse 85, temperature 36.9 °C (98.5 °F), resp. rate 17, weight 117.935 kg (260 lb), SpO2 96 %.      Patient seen and examined  No acute distress  Breathing non labored  RRR  LLE PRITESH dressing with min sanguinous exudate at inferior pole.  There is no erythema, induration, or signs of continued infection.  Patient clearly fires tibialis anterior, EHL, and gastrocnemius/soleus. Sensation is intact to light touch throughout superficial peroneal, deep peroneal, tibial, saphenous, and sural nerve distributions. Strong and palpable 2+ dorsalis pedis and posterior tibial pulses with capillary refill less than 2 seconds. No lower leg tenderness or discomfort.           Active Hospital Problems    Diagnosis   • Sepsis (CMS-[facility]) [A41.9]     Priority: High   • Septic joint of left knee joint (CMS-[facility]) [M00.9]     Priority: High   • Thrombocytopenia (CMS-[facility]) [D69.6]     Priority: Medium   • Glaucoma [H40.9]     Priority: Low   • S/P knee replacement [Z96.659]     Priority: Low   • Dyspnea on exertion [R06.09]       Assessment/Plan:  DC pending set up of outpatient services  POD#7 S/P Left total knee revision of patellar component, irrigation and debridement.  Wt bearing status - WBAT  Wound care/Drains - PRITESH dressing to stay in place until follow up  Future Procedures - none planned   Sutures/Staples out- 14-21 days post operatively  PT/OT-initiated  Antibiotics: rocephin 2g IV QD  DVT Prophylaxis- TEDS/SCDs/Foot pumps  Boyce-none  Case Coordination for Discharge Planning - Disposition pending Abx needs.

## 2017-06-18 NOTE — CARE PLAN
Problem: Safety  Goal: Will remain free from injury  Outcome: PROGRESSING AS EXPECTED  Bed alarm refused, A&O x4, slipper socks, bed locked and in low position, call light and personal belongings with reach, report given to CNA, appropriate signs outside door, hourly rounding in place.     Problem: Venous Thromboembolism (VTW)/Deep Vein Thrombosis (DVT) Prevention:  Goal: Patient will participate in Venous Thrombosis (VTE)/Deep Vein Thrombosis (DVT)Prevention Measures  Outcome: PROGRESSING AS EXPECTED  SCD's in place. Heparin given.     Problem: Pain Management  Goal: Pain level will decrease to patient’s comfort goal  Outcome: PROGRESSING AS EXPECTED  Pt denies pain.

## 2017-06-18 NOTE — PROGRESS NOTES
[facility] Hospitalist Progress Note    Date of Service: 2017    Chief Complaint  67 y.o. male hx of infected left knee w hardware in place admitted 2017 with increased left knee swelling and pain      Interval Problem Update    No overnight events awaiting approval for IV abx   Having loose stool    Consultants/Specialty    Ortho  .  ID            Review of Systems   Constitutional: Negative for fever, chills, malaise/fatigue and diaphoresis.   HENT: Negative.    Eyes: Negative.    Respiratory: Negative for cough, hemoptysis and shortness of breath.    Cardiovascular: Positive for leg swelling. Negative for chest pain and palpitations.   Gastrointestinal: Negative for nausea, vomiting, abdominal pain, diarrhea, blood in stool and melena.   Genitourinary: Negative for dysuria, urgency, hematuria and flank pain.   Musculoskeletal: Positive for joint pain. Negative for myalgias.   Skin: Negative.    Neurological: Negative for dizziness, sensory change, speech change, focal weakness and loss of consciousness.   Psychiatric/Behavioral: Negative.       Physical Exam  Laboratory/Imaging   Hemodynamics  Temp (24hrs), Av.2 °C (99 °F), Min:36.8 °C (98.3 °F), Max:37.7 °C (99.8 °F)   Temperature: 36.9 °C (98.5 °F)  Pulse  Av.2  Min: 65  Max: 105    Blood Pressure : 110/78 mmHg      Respiratory      Respiration: 17, Pulse Oximetry: 96 %        RUL Breath Sounds: Clear, RML Breath Sounds: Diminished, RLL Breath Sounds: Diminished, ADAM Breath Sounds: Clear, LLL Breath Sounds: Diminished    Fluids    Intake/Output Summary (Last 24 hours) at 17 1555  Last data filed at 17 0108   Gross per 24 hour   Intake    100 ml   Output    800 ml   Net   -700 ml       Nutrition  Orders Placed This Encounter   Procedures   • DIET ORDER     Standing Status: Standing      Number of Occurrences: 1      Standing Expiration Date:      Order Specific Question:  Diet:     Answer:  Regular [1]     Physical Exam    Constitutional: He is oriented to person, place, and time. He appears well-developed. No distress.   HENT:   Head: Normocephalic and atraumatic.   Right Ear: External ear normal.   Left Ear: External ear normal.   Mouth/Throat: No oropharyngeal exudate.   Eyes: Right eye exhibits no discharge. Left eye exhibits no discharge. No scleral icterus.   Neck: Neck supple. No JVD present. No tracheal deviation present.   Cardiovascular: Normal rate and regular rhythm.  Exam reveals no friction rub.    No murmur heard.  Pulmonary/Chest: No stridor. He has decreased breath sounds. He has no wheezes. He has no rhonchi. He exhibits no tenderness.   Abdominal: Soft. Bowel sounds are normal. He exhibits no distension. There is no tenderness. There is no rebound.   Musculoskeletal: He exhibits edema (left knee) and tenderness.   Neurological: He is alert and oriented to person, place, and time. No cranial nerve deficit. He exhibits normal muscle tone.   Skin: Skin is warm and dry. He is not diaphoretic. No cyanosis or erythema. Nails show no clubbing.   Left knee Wound vac in place no surrounding erythema   Psychiatric: He has a normal mood and affect. His behavior is normal.   Vitals reviewed.                               Assessment/Plan     * Septic joint of left knee joint (CMS-[facility])  Assessment & Plan  S/p irrigation and debridement   Cx beta strep group G  On ceftriaxone, ID Dr krueger following    MSW to assists regarding options for outpt IV abx SNF referral ordered  D/c stool softeners add lactinex    Sepsis (CMS-HCC)  Assessment & Plan  attrib to septic arthritis   resolved    Thrombocytopenia (CMS-HCC) (present on admission)  Assessment & Plan  improved    Dyspnea on exertion  Assessment & Plan  Resolved   CXR atelectasis  Continue IS and clinical monitoring    S/P knee replacement (present on admission)  Assessment & Plan  Previously   hardware removed.  .    Glaucoma  Assessment & Plan   xalantan , timolol       Plan  of care reviewed with patient and  discussed with nursing staff    Labs reviewed, Medications reviewed and Radiology images reviewed  Boyce catheter: No Boyce      DVT Prophylaxis: Heparin      Antibiotics: Treating active infection/contamination beyond 24 hours perioperative coverage

## 2017-06-18 NOTE — PROGRESS NOTES
Assumed care of pt @1900. Bedside report received. Pt AOX 4. Pt denies pain. PICC patent with positive blood return. Pt up with standby assist. Gerson dressing to RLE. Fall precaution in place. POC discussed with pt, all questions answered at this time. Pt makes needs known, call light within reach, hourly rounding in place.

## 2017-06-18 NOTE — PROGRESS NOTES
[facility] Hospitalist Progress Note    Date of Service: 2017    Chief Complaint  67 y.o. male hx of infected left knee w hardware in place admitted 2017 with increased left knee swelling and pain      Interval Problem Update    No overnight events no coverage for outpt infusion    Consultants/Specialty    Ortho  .  ID            Review of Systems   Constitutional: Negative for fever and chills.   HENT: Negative.    Eyes: Negative.    Respiratory: Negative for cough, hemoptysis, shortness of breath and wheezing.    Cardiovascular: Positive for leg swelling. Negative for chest pain and palpitations.   Gastrointestinal: Negative for vomiting, abdominal pain, constipation, blood in stool and melena.   Genitourinary: Positive for dysuria (improved). Negative for urgency, hematuria and flank pain.   Musculoskeletal: Positive for joint pain. Negative for myalgias.   Skin: Negative.    Neurological: Negative for sensory change, speech change, focal weakness and loss of consciousness.   Psychiatric/Behavioral: Negative.       Physical Exam  Laboratory/Imaging   Hemodynamics  Temp (24hrs), Av.5 °C (99.5 °F), Min:36.3 °C (97.4 °F), Max:38.1 °C (100.5 °F)   Temperature: 37.7 °C (99.8 °F)  Pulse  Av.1  Min: 65  Max: 105    Blood Pressure : 131/61 mmHg      Respiratory      Respiration: 16, Pulse Oximetry: 90 %        RUL Breath Sounds: Clear, RML Breath Sounds: Diminished, RLL Breath Sounds: Diminished, ADAM Breath Sounds: Clear, LLL Breath Sounds: Diminished    Fluids    Intake/Output Summary (Last 24 hours) at 17 1714  Last data filed at 17 0800   Gross per 24 hour   Intake    100 ml   Output   1200 ml   Net  -1100 ml       Nutrition  Orders Placed This Encounter   Procedures   • DIET ORDER     Standing Status: Standing      Number of Occurrences: 1      Standing Expiration Date:      Order Specific Question:  Diet:     Answer:  Regular [1]     Physical Exam   Constitutional: He is oriented to  person, place, and time. He appears well-developed. No distress.   HENT:   Head: Normocephalic and atraumatic.   Mouth/Throat: No oropharyngeal exudate.   Eyes: Conjunctivae are normal. Right eye exhibits no discharge. Left eye exhibits no discharge. No scleral icterus.   Neck: Neck supple. No JVD present. No tracheal deviation present.   Cardiovascular: Normal rate and regular rhythm.  Exam reveals no friction rub.    No murmur heard.  Pulmonary/Chest: He has decreased breath sounds. He has no wheezes. He has no rhonchi. He exhibits no tenderness.   Abdominal: Soft. Bowel sounds are normal. He exhibits no distension. There is no tenderness. There is no rebound and no guarding.   Musculoskeletal: He exhibits edema and tenderness.   Neurological: He is alert and oriented to person, place, and time. No cranial nerve deficit.   Skin: Skin is warm and dry. He is not diaphoretic. No cyanosis or erythema. Nails show no clubbing.   Left knee Wound vac in place no surrounding erythema   Psychiatric: He has a normal mood and affect. His behavior is normal.   Vitals reviewed.      Recent Labs      06/15/17   0254   WBC  9.6   RBC  3.74*   HEMOGLOBIN  11.5*   HEMATOCRIT  34.6*   MCV  92.5   MCH  30.7   MCHC  33.2*   RDW  50.0   PLATELETCT  159*   MPV  11.8     Recent Labs      06/15/17   0254   SODIUM  133*   POTASSIUM  3.9   CHLORIDE  102   CO2  25   GLUCOSE  113*   BUN  13   CREATININE  0.71   CALCIUM  8.7                      Assessment/Plan     * Septic joint of left knee joint (CMS-HCC)  Assessment & Plan  S/p irrigation and debridement   Cx beta strep group G  On ceftriaxone, ID following    MSW following regarding options for outpt IV abx  SNF referral    Sepsis (CMS-HCC)  Assessment & Plan  attrib to septic arthritis   resolved    Thrombocytopenia (CMS-HCC) (present on admission)  Assessment & Plan  improved    Dyspnea on exertion  Assessment & Plan  Resolved   CXR atelectasis  Continue IS and clinical  monitoring    Dysuria  Assessment & Plan  Improved u/a negative     S/P knee replacement (present on admission)  Assessment & Plan  Previously   hardware removed.  .    Glaucoma  Assessment & Plan   xalantan , timolol       Plan of care reviewed with patient and wife and  discussed with nursing staff    Labs reviewed, Medications reviewed and Radiology images reviewed  Boyce catheter: No Boyce      DVT Prophylaxis: Heparin      Antibiotics: Treating active infection/contamination beyond 24 hours perioperative coverage

## 2017-06-19 PROCEDURE — 99232 SBSQ HOSP IP/OBS MODERATE 35: CPT | Performed by: HOSPITALIST

## 2017-06-19 PROCEDURE — 700105 HCHG RX REV CODE 258: Performed by: INTERNAL MEDICINE

## 2017-06-19 PROCEDURE — 700111 HCHG RX REV CODE 636 W/ 250 OVERRIDE (IP): Performed by: INTERNAL MEDICINE

## 2017-06-19 PROCEDURE — 700111 HCHG RX REV CODE 636 W/ 250 OVERRIDE (IP): Performed by: ORTHOPAEDIC SURGERY

## 2017-06-19 PROCEDURE — A9270 NON-COVERED ITEM OR SERVICE: HCPCS | Performed by: HOSPITALIST

## 2017-06-19 PROCEDURE — 700102 HCHG RX REV CODE 250 W/ 637 OVERRIDE(OP): Performed by: HOSPITALIST

## 2017-06-19 PROCEDURE — 770001 HCHG ROOM/CARE - MED/SURG/GYN PRIV*

## 2017-06-19 RX ADMIN — LACTOBACILLUS ACIDOPHILUS / LACTOBACILLUS BULGARICUS 1 PACKET: 100 MILLION CFU STRENGTH GRANULES at 17:30

## 2017-06-19 RX ADMIN — TIMOLOL MALEATE 1 DROP: 2.5 SOLUTION OPHTHALMIC at 08:49

## 2017-06-19 RX ADMIN — LACTOBACILLUS ACIDOPHILUS / LACTOBACILLUS BULGARICUS 1 PACKET: 100 MILLION CFU STRENGTH GRANULES at 11:52

## 2017-06-19 RX ADMIN — HEPARIN SODIUM 5000 UNITS: 5000 INJECTION, SOLUTION INTRAVENOUS; SUBCUTANEOUS at 08:48

## 2017-06-19 RX ADMIN — HEPARIN SODIUM 5000 UNITS: 5000 INJECTION, SOLUTION INTRAVENOUS; SUBCUTANEOUS at 19:38

## 2017-06-19 RX ADMIN — TIMOLOL MALEATE 1 DROP: 2.5 SOLUTION OPHTHALMIC at 19:39

## 2017-06-19 RX ADMIN — CEFTRIAXONE SODIUM 2 G: 2 INJECTION, POWDER, FOR SOLUTION INTRAMUSCULAR; INTRAVENOUS at 08:49

## 2017-06-19 RX ADMIN — LACTOBACILLUS ACIDOPHILUS / LACTOBACILLUS BULGARICUS 1 PACKET: 100 MILLION CFU STRENGTH GRANULES at 08:49

## 2017-06-19 RX ADMIN — LATANOPROST 1 DROP: 50 SOLUTION OPHTHALMIC at 19:38

## 2017-06-19 ASSESSMENT — ENCOUNTER SYMPTOMS
FEVER: 0
ORTHOPNEA: 0
WHEEZING: 0
NAUSEA: 0
SHORTNESS OF BREATH: 0
HEMOPTYSIS: 0
PSYCHIATRIC NEGATIVE: 1
ABDOMINAL PAIN: 0
VOMITING: 0
LOSS OF CONSCIOUSNESS: 0
DIZZINESS: 0
COUGH: 0
FLANK PAIN: 0
DIARRHEA: 0
BLOOD IN STOOL: 0
PALPITATIONS: 0
MYALGIAS: 0
EYES NEGATIVE: 1
FOCAL WEAKNESS: 0
SPEECH CHANGE: 0
CHILLS: 0
SENSORY CHANGE: 0

## 2017-06-19 ASSESSMENT — PAIN SCALES - GENERAL: PAINLEVEL_OUTOF10: 0

## 2017-06-19 NOTE — DISCHARGE PLANNING
Call from Kamila with [facility] (patients 3rd choice) patient has been accepted for admission. Once Patient has made a decision for [facility], please contact [facility] to notify.

## 2017-06-19 NOTE — DISCHARGE PLANNING
Received choice form for SNF services, referral has been sent to (1)[facility], (2)[facility], (3) [facility] and (4) [facility] per request.

## 2017-06-19 NOTE — PROGRESS NOTES
[facility]    Date of Service: 2017    Chief Complaint  67 y.o. male hx of infected left knee w hardware in place admitted 2017 with increased left knee swelling and pain      Interval Problem Update    Feels better, pt and wife made SNF choices    Consultants/Specialty    Ortho  .  ID            Review of Systems   Constitutional: Negative for fever and chills.   HENT: Negative.    Eyes: Negative.    Respiratory: Negative for cough, hemoptysis, shortness of breath and wheezing.    Cardiovascular: Positive for leg swelling. Negative for chest pain, palpitations and orthopnea.   Gastrointestinal: Negative for nausea, vomiting, abdominal pain, diarrhea, blood in stool and melena.   Genitourinary: Negative for dysuria, urgency, hematuria and flank pain.   Musculoskeletal: Positive for joint pain. Negative for myalgias.   Skin: Negative.    Neurological: Negative for dizziness, sensory change, speech change, focal weakness and loss of consciousness.   Psychiatric/Behavioral: Negative.       Physical Exam  Laboratory/Imaging   Hemodynamics  Temp (24hrs), Av.7 °C (98.1 °F), Min:36.4 °C (97.5 °F), Max:37.2 °C (99 °F)   Temperature: 36.6 °C (97.8 °F)  Pulse  Av.3  Min: 65  Max: 105    Blood Pressure : 113/78 mmHg      Respiratory      Respiration: 18, Pulse Oximetry: 92 %        RUL Breath Sounds: Clear, RML Breath Sounds: Expiratory Wheezes, RLL Breath Sounds: Expiratory Wheezes, ADAM Breath Sounds: Clear, LLL Breath Sounds: Clear;Diminished    Fluids    Intake/Output Summary (Last 24 hours) at 17 1512  Last data filed at 17 0900   Gross per 24 hour   Intake    840 ml   Output   1750 ml   Net   -910 ml       Nutrition  Orders Placed This Encounter   Procedures   • DIET ORDER     Standing Status: Standing      Number of Occurrences: 1      Standing Expiration Date:      Order Specific Question:  Diet:     Answer:  Regular [1]     Physical Exam   Constitutional: He is  oriented to person, place, and time. He appears well-developed. No distress.   HENT:   Head: Normocephalic and atraumatic.   Mouth/Throat: No oropharyngeal exudate.   Eyes: Conjunctivae are normal. Right eye exhibits no discharge. Left eye exhibits no discharge. No scleral icterus.   Neck: Neck supple. No JVD present. No tracheal deviation present.   Cardiovascular: Normal rate and regular rhythm.  Exam reveals no friction rub.    No murmur heard.  Pulmonary/Chest: No stridor. He has decreased breath sounds. He has no wheezes. He has no rhonchi. He exhibits no tenderness.   Abdominal: Soft. Bowel sounds are normal. He exhibits no distension. There is no tenderness. There is no rebound and no guarding.   Musculoskeletal: He exhibits edema (left knee).   Neurological: He is alert and oriented to person, place, and time. No cranial nerve deficit. He exhibits normal muscle tone.   Skin: Skin is warm and dry. He is not diaphoretic. No cyanosis or erythema. Nails show no clubbing.   Left knee Wound vac in place no surrounding erythema   Psychiatric: He has a normal mood and affect. His behavior is normal.   Vitals reviewed.                               Assessment/Plan     * Septic joint of left knee joint (CMS-[facility])  Assessment & Plan  S/p irrigation and debridement   Cx beta strep group G  On ceftriaxone, evaluated by ID Dr krueger     MSW  Assisting with SNF referral as no coverage for outpt infusion    Sepsis (CMS-HCC)  Assessment & Plan    resolved    Thrombocytopenia (CMS-[facility]) (present on admission)  Assessment & Plan  Improved, recheck cbc    Dyspnea on exertion  Assessment & Plan  Resolved   CXR atelectasis      S/P knee replacement (present on admission)  Assessment & Plan  Previously   hardware removed.  .    Glaucoma  Assessment & Plan   xalantan , timolol       Plan of care reviewed with patient and his wife   discussed with nursing staff   And MSW  Labs reviewed, Medications reviewed and Radiology images  reviewed  Boyce catheter: No Boyce      DVT Prophylaxis: Heparin      Antibiotics: Treating active infection/contamination beyond 24 hours perioperative coverage

## 2017-06-19 NOTE — CARE PLAN
Problem: Safety  Goal: Will remain free from injury  Outcome: PROGRESSING AS EXPECTED  Pt remains safe and free from falls this shift. Room clear of any obstacles. Bed in lowest position. Call light within reach. Pt reminded to call for assistance before attempting to get out of bed. Fall precautions in place.     Problem: Pain Management  Goal: Pain level will decrease to patient’s comfort goal  Outcome: PROGRESSING AS EXPECTED  Pt denies any pain thus far this shift. Continue to monitor pain level and provide intervention as needed.

## 2017-06-19 NOTE — PROGRESS NOTES
Pt A&OX4. VSS. Pt denies chest pain/shortness of breath. Pt denies numbness/tingling. Pt tolerating regular diet with no c/o nausea/vomiting. Pt denies any pain this AM. Continue to monitor pain level and provide intervention as needed. Pt ambulated to the bathroom this AM with stand by assist with FWW. Pt tolerated ambulation well, WBAT. Pt voiding, +BM this AM, +flatus. R PICC in place, patent, dressing CDI. Dressing in place to LLE, no sign of air leak noted, old drainage observed. Plan of care reviewed. Bed in lowest position. Call light within reach. Continue to monitor.

## 2017-06-19 NOTE — DISCHARGE PLANNING
Per MICHA Mckeon request, spoke with Eladia at [facility] the cost for Pvt pay would be $127.96 per day.  Per Eladia patient would need provide information on dressing changes ect as [facility] will not service the area he lives in.  MICHA Mckeon advised.

## 2017-06-19 NOTE — CARE PLAN
Received report from day RN, assumed care at 1915; Pt A&Ox4, lying on bed; Pt denies pain; CMS intact; PRITESH dressing in place on Lt knee, minimal/moderate drainage noted (old); Pt is up w SA assist, FWW; PICC RUE assessed and is patent, CDI, SL; Pt is on RA w SaO2 >90%; Call light and personal possessions within reach, discussed safety interventions w pt; Reviewed recent notes, labs, diagnostics, MD orders; POC discussed      Problem: Safety  Goal: Will remain free from injury  Outcome: PROGRESSING AS EXPECTED  Place pt call light and belongings within reached, pt calls approriately; Instructed to call for assistance, Risk for fall education implemented; Non-skid socks on when OOB; Bed lowered and locked, upper siderails up; Hourly rounding in place    Problem: Venous Thromboembolism (VTW)/Deep Vein Thrombosis (DVT) Prevention:  Goal: Patient will participate in Venous Thrombosis (VTE)/Deep Vein Thrombosis (DVT)Prevention Measures  Outcome: PROGRESSING AS EXPECTED  SCD's On. Heparin given    Problem: Discharge Barriers/Planning  Goal: Patient’s continuum of care needs will be met  Outcome: PROGRESSING AS EXPECTED  Awaits for placement for outpt infusion. SW is following    ABX: Ceftriaxone, plan ED 07/25/17

## 2017-06-19 NOTE — PROGRESS NOTES
Orthopaedic Progress Note    Interval changes:  SNF placement pending     ROS - Patient denies any new issues.  Pain well controlled.    Blood pressure 113/78, pulse 94, temperature 36.6 °C (97.8 °F), resp. rate 18, weight 117.935 kg (260 lb), SpO2 92 %.      Patient seen and examined  No acute distress  Breathing non labored  RRR  LLE PRITESH dressing with min dried sanguinous exudate at inferior pole. There is no erythema, induration, or signs of continued infection.  Patient clearly fires tibialis anterior, EHL, and gastrocnemius/soleus. Sensation is intact to light touch throughout superficial peroneal, deep peroneal, tibial, saphenous, and sural nerve distributions. Strong and palpable 2+ dorsalis pedis and posterior tibial pulses with capillary refill less than 2 seconds. No lower leg tenderness or discomfort.         Active Hospital Problems    Diagnosis   • Sepsis (CMS-[facility]) [A41.9]     Priority: High   • Septic joint of left knee joint (CMS-[facility]) [M00.9]     Priority: High   • Thrombocytopenia (CMS-[facility]) [D69.6]     Priority: Medium   • Glaucoma [H40.9]     Priority: Low   • S/P knee replacement [Z96.659]     Priority: Low   • Dyspnea on exertion [R06.09]       Assessment/Plan:  Pending SNF placement  POD#8 S/P Left total knee revision of patellar component, irrigation and debridement.  Wt bearing status - WBAT  Wound care/Drains - PRITESH dressing to stay in place until follow up  Future Procedures - none planned   Sutures/Staples out- 14-21 days post operatively  PT/OT-initiated  Antibiotics: rocephin 2g IV QD  DVT Prophylaxis- TEDS/SCDs/Foot pumps  Boyce-none  Case Coordination for Discharge Planning - Disposition pending Abx needs.

## 2017-06-19 NOTE — DISCHARGE PLANNING
TCN met with patient and wife at bedside to discuss the care teams recommendation for SNF.Patient would prefer to DC home with infusion but informed of daily cost of meds and no HH available. Following disucssion Patient is agreeable to suggestion and selected [facility] FIRST, [facility] SECOND, [facility] THIRD, [facility] FOURTH. Choice signed and faxed to [facility]. TCN to follow as needed.

## 2017-06-20 VITALS
TEMPERATURE: 98.5 F | BODY MASS INDEX: 36.28 KG/M2 | SYSTOLIC BLOOD PRESSURE: 109 MMHG | HEART RATE: 75 BPM | RESPIRATION RATE: 18 BRPM | OXYGEN SATURATION: 93 % | DIASTOLIC BLOOD PRESSURE: 80 MMHG | WEIGHT: 260 LBS

## 2017-06-20 PROCEDURE — 700102 HCHG RX REV CODE 250 W/ 637 OVERRIDE(OP): Performed by: HOSPITALIST

## 2017-06-20 PROCEDURE — 99239 HOSP IP/OBS DSCHRG MGMT >30: CPT | Performed by: HOSPITALIST

## 2017-06-20 PROCEDURE — 700105 HCHG RX REV CODE 258: Performed by: INTERNAL MEDICINE

## 2017-06-20 PROCEDURE — 700111 HCHG RX REV CODE 636 W/ 250 OVERRIDE (IP): Performed by: INTERNAL MEDICINE

## 2017-06-20 PROCEDURE — A9270 NON-COVERED ITEM OR SERVICE: HCPCS | Performed by: HOSPITALIST

## 2017-06-20 PROCEDURE — 700111 HCHG RX REV CODE 636 W/ 250 OVERRIDE (IP): Performed by: ORTHOPAEDIC SURGERY

## 2017-06-20 RX ORDER — ONDANSETRON 4 MG/1
4 TABLET, ORALLY DISINTEGRATING ORAL EVERY 4 HOURS PRN
Qty: 10 TAB | Refills: 0
Start: 2017-06-20

## 2017-06-20 RX ORDER — POLYETHYLENE GLYCOL 3350 17 G/17G
17 POWDER, FOR SOLUTION ORAL DAILY
Refills: 3
Start: 2017-06-20

## 2017-06-20 RX ORDER — HEPARIN SODIUM 5000 [USP'U]/ML
5000 INJECTION, SOLUTION INTRAVENOUS; SUBCUTANEOUS EVERY 12 HOURS
Refills: 0
Start: 2017-06-20

## 2017-06-20 RX ORDER — L. ACIDOPHILUS/L.BULGARICUS 100MM CELL
GRANULES IN PACKET (EA) ORAL
Status: DISCONTINUED
Start: 2017-06-20 | End: 2017-06-20

## 2017-06-20 RX ORDER — ACETAMINOPHEN 325 MG/1
650 TABLET ORAL EVERY 6 HOURS PRN
Qty: 30 TAB | Refills: 0
Start: 2017-06-20

## 2017-06-20 RX ADMIN — TIMOLOL MALEATE 1 DROP: 2.5 SOLUTION OPHTHALMIC at 09:51

## 2017-06-20 RX ADMIN — CEFTRIAXONE SODIUM 2 G: 2 INJECTION, POWDER, FOR SOLUTION INTRAMUSCULAR; INTRAVENOUS at 09:50

## 2017-06-20 RX ADMIN — HEPARIN SODIUM 5000 UNITS: 5000 INJECTION, SOLUTION INTRAVENOUS; SUBCUTANEOUS at 09:50

## 2017-06-20 RX ADMIN — LACTOBACILLUS ACIDOPHILUS / LACTOBACILLUS BULGARICUS 1 PACKET: 100 MILLION CFU STRENGTH GRANULES at 09:50

## 2017-06-20 RX ADMIN — LACTOBACILLUS ACIDOPHILUS / LACTOBACILLUS BULGARICUS 1 PACKET: 100 MILLION CFU STRENGTH GRANULES at 11:50

## 2017-06-20 ASSESSMENT — PAIN SCALES - GENERAL: PAINLEVEL_OUTOF10: 0

## 2017-06-20 ASSESSMENT — LIFESTYLE VARIABLES: EVER_SMOKED: NEVER

## 2017-06-20 NOTE — DISCHARGE PLANNING
Medical Social Work    KELLY called [facility] to follow up on pt's referral and was hung up on twice. KELLY then called [facility] and left  for admissions requesting call back.

## 2017-06-20 NOTE — DISCHARGE INSTRUCTIONS
Discharge Instructions    Discharged to [facility] by car with escort. Discharged via wheelchair, hospital escort: Yes.  Special equipment needed: Not Applicable    Be sure to schedule a follow-up appointment with your primary care doctor or any specialists as instructed.     Discharge Plan:   Diet Plan: Discussed  Activity Level: Discussed  Confirmed Follow up Appointment: Patient to Call and Schedule Appointment  Confirmed Symptoms Management: Discussed  Medication Reconciliation Updated: Yes  Pneumococcal Vaccine Given - only chart on this line when given: Given (See MAR)  Influenza Vaccine Indication: Patient Refuses    I understand that a diet low in cholesterol, fat, and sodium is recommended for good health. Unless I have been given specific instructions below for another diet, I accept this instruction as my diet prescription.   Other diet: Regular    Special Instructions: Discharge instructions for the Orthopedic Patient    Follow up with Primary Care Physician within 2 weeks of discharge to home, regarding:  Review of medications and diagnostic testing.  Surveillance for medical complications.  Workup and treatment of osteoporosis, if appropriate.     -Is this a Joint Replacement patient? Yes Total Joint Knee Replacement Discharge Instructions    Pain  - The goal is to slowly wean off the prescription pain medicine.  - Ice can be used for pain control.  20 minutes at a time is recommended, and never directly against your skin or incision.  - Most patients are off the pain pills by 3 weeks; others may require a low level of pain medications for many months.  If your pain continues to be severe, follow up with your physician.  Infection    Knee joint infections; occur in fewer than 2% of patients. The most common causes of infection following total knee replacement surgery are from bacteria that enter the bloodstream during dental procedures, urinary tract infections, or skin infections. These bacteria  can lodge around your knee replacement and cause an infection.  - Keep the incision as clean and dry as possible.  - Always wash your hands before touching your incision.  - Skin infections tend to develop around 7-10 days after surgery; most can be treated with oral antibiotics.  - Dental Care should be delayed for 3 months after surgery, your surgeon recommends taking a dose of antibiotics 1 hour prior to any dental procedure. After 2 years, most surgeons recommend antibiotics only before an extensive procedure.  Ask your surgeon what he recommends.  - Signs and symptoms of infection can include:  low grade fever, redness, pain, swelling and drainage from your incision.  Notify your surgeon immediately if you develop any of these symptoms.  Other instructions  - Bowel habits - constipation is extremely common and is caused by a combination of anesthesia, lack of mobility and pain medicine.  Use stool softeners or laxatives if necessary. It is important not to ignore this problem, as bowel obstructions can be a serious complication after joint replacement surgery.  - Mood/Energy Level - Many patients experience a lack of energy and endurance for up to 2-3 months after surgery.  Some may also feel down and can even become depressed.  This is likely due to the postoperative anemia, change in activity level, lack of sleep, pain medicine and just the emotional reaction to the surgery itself that is a big disruption in a person’s life.  This usually passes.  If symptoms persist, follow up with your primary physician.  - Returning to work - Your surgeon will give you more specific instructions. Depending on the type of activities you perform, it may be 6 to 8 weeks before you return to work.   Generally, if you work a sedentary job requiring little standing or walking, most patients may return within 2-6 weeks.  Manual labor jobs involving walking, lifting and standing may take longer. Your surgeon’s office can provide a  release to part-time or light duty work early on in your recovery and progress you to full duty as able.    - Driving - If your left knee was replaced and you have an automatic transmission, you may be able to begin driving in a week or so, provided you are no longer taking narcotic pain medication. If your right knee was replaced, avoid driving for 6 to 8 weeks. Remember that your reflexes may not be as sharp as before your surgery. Ask your surgeon for specific instructions.   - Avoiding falls - A fall during the first few weeks after surgery can damage your new knee and may result in a need for further surgery.   throw rugs and tack down loose carpeting.  Be aware of floor hazards such as pets, small objects or uneven surfaces.    - [employer] Metal Detectors - The sensitivity of metal detectors varies and it is likely that your prosthesis will cause an alarm.  Inform the  of your artificial joint.  Diet  - Resume your normal diet as tolerated.  - It is important to achieve a healthy nutritional status by eating a well balanced diet on a regular basis.  - Your physician may recommend that you take iron and vitamin supplements.   - Continue to drink plenty of fluids.  Shower/Bathing  - You may shower as soon as you get home from the hospital unless otherwise instructed.  - Keep your incision out of water.  To keep the incision dry when showering, cover it with a plastic bag or plastic wrap.  - Pat incision dry if it gets wet.  Don’t rub.  - Do not submerge in a bath until staples are out and the incision is completely healed. (Approximately 6-8 weeks)  Dressing Change:  Procedure (if recommended by your physician)  - Wash hands.  - Open all new dressing change materials.  - Remove old dressing and discard.  - Inspect incision for redness, increase in clear drainage, yellow/green drainage, odor and surrounding skin hot to touch.  -  ABD (large gauze) pad or “island dressing” by one corner  and lay over the incision.  Be careful not to touch the inside of the dressing that will lay over the incision.  - Secure in place as instructed (Ace wrap or tape).    Swelling/Bruising    - Swelling can last from 3-6 months.  - Elevate your leg higher than your heart while reclining.   The first week you are home you should elevate your leg an equal amount of time, as you are active.    - Anti-inflammatory pills can be taken once you have stopped the blood thinners.  - The swelling is usually worse after you go home since you are upright for longer periods of time.  - Bruising is common and can involve the entire leg including the thigh, calf and even foot. Bruising often does not appear until after you arrive home and it can be quite dramatic- purple, black, and green.  The bruising you can see is not usually concerning and will subside without any treatment.      Blood Clot Prevention  Blood clots in the legs and the less common, but frightening, clots that travel to the lungs are a real focus of our preventative. Most patients are at standard risk for them, but those patients who are at higher risk include people who have had previous clots, a family history of clotting, smoking, diabetes, obesity, advanced age, use of estrogen and a sedentary lifestyle.    - Signs of blood clots in legs - Swelling in thigh, calf or ankle that does not go down with elevation.  Pain, heat and tenderness in calf, back of calf or groin area.  NOTE: blood clots can occur in either leg.  - You have been receiving anticoagulant therapy (blood thinners) in the hospital and you may be instructed to continue at home depending on your risk factors.  - Your risk for developing a clot continues for up to 2-3 months after surgery.  You should avoid prolonged sitting and dehydration during that time (long air trips and car trips).  If you do take a trip during this time, please get up and move around every 1- 1.5 hours.  - If you are  prescribed blood-thinning medication for home, follow instructions as directed. (Handouts provided if applicable).      Activity  Once home, you should continue to stay active. The key is to remember not to overdo it! While you can expect some good days and some bad days, you should notice a gradual improvement and a gradual increase in your endurance over the next 6 to 12 months. Exercise is a critical component of home care, particularly during the first few weeks after surgery.     - Normal activities of daily living You should be able to resume most within 3 to 6 weeks following surgery. Some pain with activity and at night is common for several weeks after surgery  -  Physical Therapy Exercises - Continue to do the exercises prescribed for at least two months after surgery. Riding a stationary bicycle can help maintain muscle tone and keep your knee flexible. Try to achieve the maximum degree of bending and extension possible. (handout provided by Therapist).  - Sexual Activity -. Your surgeon can tell you when it safe to resume sexual activity.    - Sleeping Positions - You can safely sleep on your back, on either side, or on your stomach.   - Other Activities - Walk as much as you like, but remember that walking is no substitute for the exercises your doctor and physical therapist will prescribe. Lower impact activities are preferred.  If you have specific questions, consult your Surgeon.    When to Call the Doctor   Call the physician if:   - Fever over 100.5? F  - Increased pain, drainage, redness, odor or heat around the incision area  - Shaking chills  - Increased knee pain with activity and rest  - Increased pain in calf, tenderness or redness above or below the knee  - Increased swelling of calf, ankle, foot  - Sudden increased shortness of breath, sudden onset of chest pain, localized chest pain with coughing  - Incision opening  Or, if there are any questions or concerns about medications or care.  "      -Is this patient being discharged with medication to prevent blood clots?  No    · Is patient discharged on Warfarin / Coumadin?   No     · Is patient Post Blood Transfusion?  No    PICC Home Guide  A peripherally inserted central catheter (PICC) is a long, thin, flexible tube that is inserted into a vein in the upper arm. It is a form of intravenous (IV) access. It is considered to be a \"central\" line because the tip of the PICC ends in a large vein in your chest. This large vein is called the superior vena cava (SVC). The PICC tip ends in the SVC because there is a lot of blood flow in the SVC. This allows medicines and IV fluids to be quickly distributed throughout the body. The PICC is inserted using a sterile technique by a specially trained nurse or physician. After the PICC is inserted, a chest X-ray exam is done to be sure it is in the correct place.   A PICC may be placed for different reasons, such as:  · To give medicines and liquid nutrition that can only be given through a central line. Examples are:  ¨ Certain antibiotic treatments.  ¨ Chemotherapy.  ¨ Total parenteral nutrition (TPN).  · To take frequent blood samples.  · To give IV fluids and blood products.  · If there is difficulty placing a peripheral intravenous (PIV) catheter.  If taken care of properly, a PICC can remain in place for several months. A PICC can also allow a person to go home from the hospital early. Medicine and PICC care can be managed at home by a family member or home health care team.  WHAT PROBLEMS CAN HAPPEN WHEN I HAVE A PICC?  Problems with a PICC can occasionally occur. These may include the following:  · A blood clot (thrombus) forming in or at the tip of the PICC. This can cause the PICC to become clogged. A clot-dissolving medicine called tissue plasminogen activator (tPA) can be given through the PICC to help break up the clot.  · Inflammation of the vein (phlebitis) in which the PICC is placed. Signs of " "inflammation may include redness, pain at the insertion site, red streaks, or being able to feel a \"cord\" in the vein where the PICC is located.  · Infection in the PICC or at the insertion site. Signs of infection may include fever, chills, redness, swelling, or pus drainage from the PICC insertion site.  · PICC movement (malposition). The PICC tip may move from its original position due to excessive physical activity, forceful coughing, sneezing, or vomiting.  · A break or cut in the PICC. It is important to not use scissors near the PICC.  · Nerve or tendon irritation or injury during PICC insertion.  WHAT SHOULD I KEEP IN MIND ABOUT ACTIVITIES WHEN I HAVE A PICC?  · You may bend your arm and move it freely. If your PICC is near or at the bend of your elbow, avoid activity with repeated motion at the elbow.  · Rest at home for the remainder of the day following PICC line insertion.  · Avoid lifting heavy objects as instructed by your health care provider.  · Avoid using a crutch with the arm on the same side as your PICC. You may need to use a walker.  WHAT SHOULD I KNOW ABOUT MY PICC DRESSING?  · Keep your PICC bandage (dressing) clean and dry to prevent infection.  ¨ Ask your health care provider when you may shower. Ask your health care provider to teach you how to wrap the PICC when you do take a shower.  · Change the PICC dressing as instructed by your health care provider.  · Change your PICC dressing if it becomes loose or wet.  WHAT SHOULD I KNOW ABOUT PICC CARE?  · Check the PICC insertion site daily for leakage, redness, swelling, or pain.  · Do not take a bath, swim, or use hot tubs when you have a PICC. Cover PICC line with clear plastic wrap and tape to keep it dry while showering.  · Flush the PICC as directed by your health care provider. Let your health care provider know right away if the PICC is difficult to flush or does not flush. Do not use force to flush the PICC.  · Do not use a syringe " "that is less than 10 mL to flush the PICC.  · Never pull or tug on the PICC.  · Avoid blood pressure checks on the arm with the PICC.  · Keep your PICC identification card with you at all times.  · Do not take the PICC out yourself. Only a trained clinical professional should remove the PICC.  SEEK IMMEDIATE MEDICAL CARE IF:  · Your PICC is accidentally pulled all the way out. If this happens, cover the insertion site with a bandage or gauze dressing. Do not throw the PICC away. Your health care provider will need to inspect it.  · Your PICC was tugged or pulled and has partially come out. Do not  push the PICC back in.  · There is any type of drainage, redness, or swelling where the PICC enters the skin.  · You cannot flush the PICC, it is difficult to flush, or the PICC leaks around the insertion site when it is flushed.  · You hear a \"flushing\" sound when the PICC is flushed.  · You have pain, discomfort, or numbness in your arm, shoulder, or jaw on the same side as the PICC.  · You feel your heart \"racing\" or skipping beats.  · You notice a hole or tear in the PICC.  · You develop chills or a fever.  MAKE SURE YOU:   · Understand these instructions.  · Will watch your condition.  · Will get help right away if you are not doing well or get worse.     This information is not intended to replace advice given to you by your health care provider. Make sure you discuss any questions you have with your health care provider.     Document Released: 06/23/2004 Document Revised: 01/08/2016 Document Reviewed: 08/25/2014  [employer] Interactive Patient Education ©2016 [employer]      Depression / Suicide Risk    As you are discharged from this [facility] facility, it is important to learn how to keep safe from harming yourself.    Recognize the warning signs:  · Abrupt changes in personality, positive or negative- including increase in energy   · Giving away possessions  · Change in eating patterns- significant weight " changes-  positive or negative  · Change in sleeping patterns- unable to sleep or sleeping all the time   · Unwillingness or inability to communicate  · Depression  · Unusual sadness, discouragement and loneliness  · Talk of wanting to die  · Neglect of personal appearance   · Rebelliousness- reckless behavior  · Withdrawal from people/activities they love  · Confusion- inability to concentrate     If you or a loved one observes any of these behaviors or has concerns about self-harm, here's what you can do:  · Talk about it- your feelings and reasons for harming yourself  · Remove any means that you might use to hurt yourself (examples: pills, rope, extension cords, firearm)  · Get professional help from the community (Mental Health, Substance Abuse, psychological counseling)  · Do not be alone:Call your Safe Contact- someone whom you trust who will be there for you.  · Call your local CRISIS HOTLINE 793-8649 or 328-918-0132  · Call your local Children's Mobile Crisis Response Team [place] (307) 581-4010 or www.[employer]  · Call the toll free National Suicide Prevention Hotlines   · National Suicide Prevention Lifeline 545-383-DWVX (0044)  · National Hope Line Network 800-SUICIDE (113-4457)

## 2017-06-20 NOTE — DISCHARGE SUMMARY
DATE OF DISCHARGE:  06/20/2017    PRIMARY CARE PHYSICIAN:  In [place], [place].    PRIMARY ORTHOPEDIST:  Dr. Blas Lewis, was cared for by Dr. Huang Cat   during his hospitalization.    INFECTIOUS DISEASE:  Sandeep Mcgregor MD    DISCHARGE DIAGNOSES:  1.  Infected left knee replacement, beta strep group G.  2.  Sepsis, resolved.  3.  Chronic thrombocytopenia.  4.  Glaucoma.    DISCHARGE MEDICATIONS:  1.  Acetaminophen 650 mg every 6 hours as needed.  2.  Heparin 5000 units every 12 hours.  3.  Ceftriaxone 2 g every 24 hours, last dose 07/17/2017.  4.  Zofran 4 mg every 4 hours as needed for nausea and vomiting.  5.  Polyethylene glycol 17 g daily.  6.  Timolol 0.25% solution 1 drop in both eyes daily.  7.  Xalatan 0.005% solution 1 drop in both eyes in the evening.    MAJOR STUDIES AND PROCEDURES PERFORMED WHILE INPATIENT:  Left total knee   revision of patellar component, irrigation and debridement, 06/11/2017 by Dr. Huang Cat.    HOSPITAL COURSE:  A 67-year-old male.  He has a history of a left total knee   replacement with hardware placed many years ago.  The patient is followed by   Dr. Blas Lewis as an outpatient.  He had some difficulty with infection   previously.  The patient presented with swollen left knee.  Knee joint was   aspirated.  Synovial fluid is growing beta streptococcus group G.  The patient   went for revision of his total knee by Dr. Cat as above.    The patient has been seen in consultation by infectious disease.  They are   recommending 35 days of ceftriaxone with a stop date of 07/17/2017.  This   should be followed by p.o. antibiotic suppression.  The patient is being   transferred to skilled nursing facility, continue, he has PICC line in place.    DISCHARGE DISPOSITION:  To skilled nursing facility.    FOLLOWUP PLAN:  The patient needs followup with orthopedics on or around   06/25/2017 to have a PRITESH drain removed and staples removed.  PRITESH drain   should  remain in place until this time.    DIET:  Regular as tolerated.    Forty-five minutes was spent discharging this patient from the hospital.       ____________________________________     MD LINA BAIRD / PHIL    DD:  06/20/2017 15:26:34  DT:  06/20/2017 15:47:53    D#:  [id number]  Job#:  [id number]

## 2017-06-20 NOTE — CARE PLAN
Received report from day RN, assumed care at 1915; Pt A&Ox4, lying on bed; Pt denies pain; CMS intact; PRITESH dressing in place on Lt knee, minimal/moderate drainage noted (old); Pt is up w SA assist, FWW; PICC RUE assessed and is patent, CDI, SL; Pt is on RA w SaO2 >90%; Call light and personal possessions within reach, discussed safety interventions w pt; Reviewed recent notes, labs, diagnostics, MD orders; POC discussed      Problem: Safety  Goal: Will remain free from injury  Outcome: PROGRESSING AS EXPECTED  Place pt call light and belongings within reached, pt calls approriately; Instructed to call for assistance, Risk for fall education implemented; Non-skid socks on when OOB; Bed lowered and locked, upper siderails up; Hourly rounding in place    Problem: Venous Thromboembolism (VTW)/Deep Vein Thrombosis (DVT) Prevention:  Goal: Patient will participate in Venous Thrombosis (VTE)/Deep Vein Thrombosis (DVT)Prevention Measures  Outcome: PROGRESSING AS EXPECTED  SCD's On. Heparin given    ABX: Ceftriaxone, plan ED 07/25/17    Problem: Discharge Barriers/Planning  Goal: Patient’s continuum of care needs will be met  Outcome: PROGRESSING AS EXPECTED  Awaits for placement in SNF. SW is following

## 2017-06-20 NOTE — DISCHARGE PLANNING
Received Transport communication requests,     Transportation has been arranged with Kamila at [facility]. Pt's transport is scheduled for 6:00 today via [facility]. Pt will KAILASH Mendoza notified.

## 2017-06-20 NOTE — DISCHARGE PLANNING
Medical Social Work    SW met with pt and spouse to discuss dc. SW informed pt that SW attempted to follow up with both SNF's in [place] and has not heard back from either of them. SW informed pt he has been accepted to [facility] and can possibly dc today. Pt and spouse agreeable to having pt stay in [place] for SNF and are willing to dc to [facility]. Transportation form completed and faxed to [facility].

## 2017-06-20 NOTE — CARE PLAN
Problem: Safety  Goal: Will remain free from injury  Outcome: PROGRESSING AS EXPECTED  Safety precautions in place. Call light within reach. Bed is low and in locked position. Hourly rounding in place.     Problem: Venous Thromboembolism (VTW)/Deep Vein Thrombosis (DVT) Prevention:  Goal: Patient will participate in Venous Thrombosis (VTE)/Deep Vein Thrombosis (DVT)Prevention Measures  Outcome: PROGRESSING AS EXPECTED  Pt has SCDs on. Heparin given per MAR.

## 2017-06-21 NOTE — PROGRESS NOTES
Pt. discharged to [facility]. Pt left unit via wheelchair with escort. Reviewed discharge instructions and follow up appointments Patient states understanding of all the instructions. Discharge instructions and personal belongings with patient upon leaving.

## 2019-11-15 NOTE — DISCHARGE PLANNING
[facility] received a call from Eladia. She stated that the ID MD stated that this should be worked on. [facility] still has not received a choice form or any direction. LM with Raghu regarding this patient yesterday. LM for KELLY John today. KELLY Wood should be here at 1200, [facility] will follow up with Nina if KELLY John is unable to reply.    Spoke to patient. She reports that she is having burning in her eyes due to dryness.  Also her cough has gotten worse.   Writer advised that patient continue with home care for cough. Use OTC eye drops to help re-wet eyes and run a humidifier in apartment to help with dryness.  She denies wheezing, chest pain or shortness of breath.    Advised that if her symptoms worsen before her ER follow up visit on 11/20/19, that she go to walk-in clinic to follow up or ER if symptoms severe.   Patient verbalized understanding.

## 2020-07-07 NOTE — PROGRESS NOTES
Orthopaedic Progress Note    Interval changes:  Patient doing well  Patient cleared for DC by ortho pending medicine and ID team clearance  Patient to follow up with Dr Lewis  PRITESH dressing replaced today secondary to seal failure    ROS - Patient denies any new issues.  Pain well controlled.    Blood pressure 153/84, pulse 82, temperature 36.9 °C (98.5 °F), resp. rate 18, weight 117.935 kg (260 lb), SpO2 94 %.      Patient seen and examined  No acute distress  Breathing non labored  RRR  LLE PRITESH dressing with mod sanguinous exudate and not maintaining seal this am.  New PRITESH placed, surgical incision is well approximated and is dry and clean.  There is no erythema, induration, or signs of infection.  Patient clearly fires tibialis anterior, EHL, and gastrocnemius/soleus. Sensation is intact to light touch throughout superficial peroneal, deep peroneal, tibial, saphenous, and sural nerve distributions. Strong and palpable 2+ dorsalis pedis and posterior tibial pulses with capillary refill less than 2 seconds. No lower leg tenderness or discomfort.        Recent Labs      06/12/17   0145  06/13/17   0129  06/14/17   0630   WBC  14.9*  12.2*  8.0   RBC  4.16*  3.95*  3.27*   HEMOGLOBIN  12.9*  12.2*  10.1*   HEMATOCRIT  39.4*  37.9*  31.1*   MCV  94.7  95.9  95.1   MCH  31.0  30.9  30.9   MCHC  32.7*  32.2*  32.5*   RDW  49.4  51.6*  50.4*   PLATELETCT  97*  108*  109*   MPV  11.6  12.3  12.3       Active Hospital Problems    Diagnosis   • Sepsis (CMS-[facility]) [A41.9]     Priority: High   • Septic joint of left knee joint (CMS-[facility]) [M00.9]     Priority: High   • Thrombocytopenia (CMS-[facility]) [D69.6]     Priority: Medium   • Glaucoma [H40.9]     Priority: Low   • S/P knee replacement [Z96.659]     Priority: Low   • Dyspnea on exertion [R06.09]   • Constipation [K59.00]     Add miralax         Assessment/Plan:  Patient doing well  POD#3 S/P Left total knee revision of patellar component, irrigation and debridement.  Wt  bearing status - WBAT  Wound care/Drains - PRITESH dressing to stay in place until follow up  Future Procedures - none planned   Sutures/Staples out- 10-14 days post operatively  PT/OT-initiated  Antibiotics: rocephin 2g IV QD  DVT Prophylaxis- TEDS/SCDs/Foot pumps  Boyce-none  Case Coordination for Discharge Planning - Disposition pending Abx needs.       done

## (undated) DEVICE — SUTURE 3-0 ETHILON FS-1 - (36/BX) 30 INCH

## (undated) DEVICE — ELECTRODE 850 FOAM ADHESIVE - HYDROGEL RADIOTRNSPRNT (50/PK)

## (undated) DEVICE — GOWN WARMING STANDARD FLEX - (30/CA)

## (undated) DEVICE — SENSOR SPO2 NEO LNCS ADHESIVE (20/BX) SEE USER NOTES

## (undated) DEVICE — SUCTION INSTRUMENT YANKAUER BULBOUS TIP W/O VENT (50EA/CA)

## (undated) DEVICE — GLOVE BIOGEL SZ 8.5 SURGICAL PF LTX - (50PR/BX 4BX/CA)

## (undated) DEVICE — LACTATED RINGERS INJ 1000 ML - (14EA/CA 60CA/PF)

## (undated) DEVICE — DRAPE LARGE 3 QUARTER - (20/CA)

## (undated) DEVICE — DETERGENT RENUZYME PLUS 10 OZ PACKET (50/BX)

## (undated) DEVICE — HEAD HOLDER JUNIOR/ADULT

## (undated) DEVICE — CHLORAPREP 26 ML APPLICATOR - ORANGE TINT(25/CA)

## (undated) DEVICE — SET LEADWIRE 5 LEAD BEDSIDE DISPOSABLE ECG (1SET OF 5/EA)

## (undated) DEVICE — HANDPIECE 10FT INTPLS SCT PLS IRRIGATION HAND CONTROL SET (6/PK)

## (undated) DEVICE — BOVIE BLADE COATED - (50/PK)

## (undated) DEVICE — PROTECTOR ULNA NERVE - (36PR/CA)

## (undated) DEVICE — DISPOSABLE WOUND VAC PICO 10 X 30 CM - WOUND CARE (3/CA)

## (undated) DEVICE — MASK, LARYNGEAL AIRWAY #5

## (undated) DEVICE — GLOVE BIOGEL SZ 7.5 SURGICAL PF LTX - (50PR/BX 4BX/CA)

## (undated) DEVICE — BANDAGE ELASTIC 6 HONEYCOMB - 6X5YD LF (20/CA)"

## (undated) DEVICE — TOWELS CLOTH SURGICAL - (4/PK 20PK/CA)

## (undated) DEVICE — SUTURE 2-0 VICRYL PLUS CT-1 - 8 X 18 INCH(12/BX)

## (undated) DEVICE — KIT EVACUATER 3 SPRING PVC LF 1/8 DRAIN SIZE (10EA/CA)"

## (undated) DEVICE — MASK ANESTHESIA ADULT  - (100/CA)

## (undated) DEVICE — LEAD SET 6 DISP. EKG NIHON KOHDEN (100EA/CA) [9859].

## (undated) DEVICE — TUBING CLEARLINK DUO-VENT - C-FLO (48EA/CA)

## (undated) DEVICE — SPONGE GAUZESTER 4 X 4 4PLY - (128PK/CA)

## (undated) DEVICE — SODIUM CHL. IRRIGATION 0.9% 3000ML (4EA/CA 65CA/PF)

## (undated) DEVICE — PADDING CAST 4 IN X 4 YDS - SOF-ROLL (12RL/BG 6BG/CT)

## (undated) DEVICE — SET EXTENSION WITH 2 PORTS (48EA/CA) ***PART #2C8610 IS A SUBSTITUTE*****

## (undated) DEVICE — CANISTER SUCTION 3000ML MECHANICAL FILTER AUTO SHUTOFF MEDI-VAC NONSTERILE LF DISP  (40EA/CA)

## (undated) DEVICE — GLOVE BIOGEL INDICATOR SZ 7.5 SURGICAL PF LTX - (50PR/BX 4BX/CA)

## (undated) DEVICE — DRAPE LOWER EXTREMETY - (6/CA)

## (undated) DEVICE — SODIUM CHL IRRIGATION 0.9% 1000ML (12EA/CA)

## (undated) DEVICE — CONTAINER SPECIMEN BAG OR - STERILE 4 OZ W/LID (100EA/CA)

## (undated) DEVICE — KIT ANESTHESIA W/CIRCUIT & 3/LT BAG W/FILTER (20EA/CA)

## (undated) DEVICE — BLADE SURGICAL CLIPPER - (50EA/CA)

## (undated) DEVICE — KIT ROOM DECONTAMINATION

## (undated) DEVICE — DRESSING XEROFORM 1X8 - (50/BX 4BX/CA)

## (undated) DEVICE — GLOVE BIOGEL PI INDICATOR SZ 7.0 SURGICAL PF LF - (50/BX 4BX/CA)

## (undated) DEVICE — PACK MAJOR ORTHO - (2EA/CA)

## (undated) DEVICE — GLOVE BIOGEL INDICATOR SZ 8 SURGICAL PF LTX - (50/BX 4BX/CA)

## (undated) DEVICE — SUTURE 1 VICRYL PLUS CTX - 8 X 18 INCH (12/BX)

## (undated) DEVICE — SUTURE GENERAL

## (undated) DEVICE — ELECTRODE DUAL RETURN W/ CORD - (50/PK)

## (undated) DEVICE — NEPTUNE 4 PORT MANIFOLD - (20/PK)